# Patient Record
Sex: MALE | Race: WHITE | Employment: FULL TIME | URBAN - NONMETROPOLITAN AREA
[De-identification: names, ages, dates, MRNs, and addresses within clinical notes are randomized per-mention and may not be internally consistent; named-entity substitution may affect disease eponyms.]

---

## 2017-04-09 PROBLEM — F41.9 ANXIETY: Status: ACTIVE | Noted: 2017-04-09

## 2017-04-09 PROBLEM — S40.012A CONTUSION OF SHOULDER, LEFT: Status: ACTIVE | Noted: 2017-04-09

## 2017-04-09 PROBLEM — S82.891D CLOSED FRACTURE OF RIGHT ANKLE WITH ROUTINE HEALING: Status: ACTIVE | Noted: 2017-04-09

## 2017-04-11 PROBLEM — S46.812A STRAIN OF LEFT DELTOID MUSCLE: Status: ACTIVE | Noted: 2017-04-11

## 2017-04-11 PROBLEM — S46.812A: Status: ACTIVE | Noted: 2017-04-11

## 2017-04-11 PROBLEM — S80.01XA CONTUSION OF KNEE, RIGHT: Status: ACTIVE | Noted: 2017-04-11

## 2017-04-11 PROBLEM — S46.819A: Status: ACTIVE | Noted: 2017-04-11

## 2017-04-11 PROBLEM — S86.812A STRAIN OF LEFT PATELLAR TENDON: Status: ACTIVE | Noted: 2017-04-11

## 2017-04-11 PROBLEM — T07.XXXA MULTIPLE ABRASIONS: Status: ACTIVE | Noted: 2017-04-11

## 2017-07-27 ENCOUNTER — OFFICE VISIT (OUTPATIENT)
Dept: PSYCHIATRY | Age: 32
End: 2017-07-27
Payer: COMMERCIAL

## 2017-07-27 VITALS — WEIGHT: 198 LBS | BODY MASS INDEX: 28.01 KG/M2

## 2017-07-27 DIAGNOSIS — F41.0 PANIC DISORDER WITHOUT AGORAPHOBIA WITH MODERATE PANIC ATTACKS: Primary | ICD-10-CM

## 2017-07-27 PROCEDURE — 99215 OFFICE O/P EST HI 40 MIN: CPT | Performed by: NURSE PRACTITIONER

## 2017-07-27 RX ORDER — HYDROXYZINE PAMOATE 25 MG/1
25 CAPSULE ORAL 3 TIMES DAILY PRN
Qty: 90 CAPSULE | Refills: 0 | Status: SHIPPED | OUTPATIENT
Start: 2017-07-27 | End: 2017-08-10

## 2017-12-05 ENCOUNTER — OFFICE VISIT (OUTPATIENT)
Dept: PSYCHIATRY | Age: 32
End: 2017-12-05
Payer: MEDICAID

## 2017-12-05 VITALS — WEIGHT: 226 LBS | BODY MASS INDEX: 31.97 KG/M2

## 2017-12-05 DIAGNOSIS — F31.60 BIPOLAR DISORDER, MIXED (HCC): ICD-10-CM

## 2017-12-05 DIAGNOSIS — F41.9 ANXIETY: Primary | ICD-10-CM

## 2017-12-05 PROCEDURE — G8484 FLU IMMUNIZE NO ADMIN: HCPCS | Performed by: NURSE PRACTITIONER

## 2017-12-05 PROCEDURE — 99214 OFFICE O/P EST MOD 30 MIN: CPT | Performed by: NURSE PRACTITIONER

## 2017-12-05 PROCEDURE — G8417 CALC BMI ABV UP PARAM F/U: HCPCS | Performed by: NURSE PRACTITIONER

## 2017-12-05 PROCEDURE — G8427 DOCREV CUR MEDS BY ELIG CLIN: HCPCS | Performed by: NURSE PRACTITIONER

## 2017-12-05 PROCEDURE — 4004F PT TOBACCO SCREEN RCVD TLK: CPT | Performed by: NURSE PRACTITIONER

## 2017-12-05 NOTE — PROGRESS NOTES
[facility] PROFESSIONAL SERVS  [facility]  [address]  [address] [place] [address]  Dept: [phone]  Dept Fax: [phone]: [phone]      Visit Date: 12/5/2017      Pertinent History & Psychiatric Examination      Florencia Olson is a 29 y.o.  male returns today after 5 months since his last visit from follow-up and medication management. He reports with good improvement in his mood. Patient states that he left his job on the last date that he came to this office--in July. David Ghazal He had had some issues in the past with his employer and decided to quit his job in July. He is reporting that he has never been as happy since he left his job. That means that he is very happy quitting his job. Patient reports that he was not appreciated at his job and therefore did not think he was necessary to remain working for them. He is reporting that he has gotten calls to return back to work but he has chosen not to go back. 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 He lives with his girlfriend who is the breadwinner, while she is going to school. Patient reports that they're living well and are not worried about paying their bills. [place], and their  renter is his girlfriend's parents, who have been good to them. Patient reports that he will return back to his working life as soon as his girlfriend graduates from nursing school. He has not been taking any of his medications and states that he is doing perfectly well. He reports no anxiety and states that he has been doing \"really really well\". Patient is complaining about the current issues with his mom. States that his mom has full custody of his older daughter, who is 11years old. Patient is reporting that his mother manipulated him into signing the Custody papers years ago. He reports that he thought that he was getting a joint custody/guardianship. Within normal limits  Appearance: Street clothes, seated on couch, with good grooming  Behavior/Motor: No abnormalities noted  Attitude toward examiner:  Cooperative, attentive, good eye contact  Speech:  spontaneous, normal rate, normal volume and well articulated  Mood:  Says \" I feel really good\"  Affect:  bright  Thought processes:  linear, goal directed and coherent  Thought content:  Homicidal ideation denies  Suicidal Ideation:  denies suicidal ideation  Delusions:  no evidence of delusions  Perceptual Disturbance:  denies any perceptual disturbance  Cognition:  In tact  Memory: age appropriate  Insight & Judgement: fair  Medication side effects:  See allergies     Clinical Assessment Medical Decision    Anxiety disorder Not Otherwise Specified  Bipolar I disorder; by hx    Past Medical History:   Diagnosis Date    Anxiety     [facility] disorder ([facility] [address]     Depression     Substance abuse        Precautions with Justification:   None    Medication Review/Mgmt: no change     Medical Issues: See above    Assessment of Risk for Harm to Self/Others:  None    PLAN  No medication ordered today as patient is stating that he does not need any medication. Advised patient to see a psychologist Victoriano Orellana, who will be in a better position to write him a letter regarding his capacity to take care of his daughter. Risks/SE's/benefits/alternate treatments discussed, patient stated understanding and is agreeable to treatment plan. Patient's Response to Treatment: positive    I spent a total of 31 minutes with the patient and over half of that time was spent on counseling and coordination of care regarding topics discussed above.     Electronically signed by Antoinette Gates CNP on 12/5/2017 at 10:54 AM

## 2018-02-04 ENCOUNTER — HOSPITAL ENCOUNTER (EMERGENCY)
Age: 33
Discharge: HOME | End: 2018-02-04
Attending: NURSE PRACTITIONER
Payer: MEDICAID

## 2018-02-04 VITALS
HEART RATE: 90 BPM | RESPIRATION RATE: 18 BRPM | OXYGEN SATURATION: 97 % | DIASTOLIC BLOOD PRESSURE: 67 MMHG | BODY MASS INDEX: 33.67 KG/M2 | SYSTOLIC BLOOD PRESSURE: 142 MMHG | TEMPERATURE: 97.8 F | WEIGHT: 238 LBS

## 2018-02-04 DIAGNOSIS — K08.89 DENTALGIA: ICD-10-CM

## 2018-02-04 DIAGNOSIS — H92.03 REFERRED OTALGIA OF BOTH EARS: Primary | ICD-10-CM

## 2018-02-04 PROCEDURE — 99212 OFFICE O/P EST SF 10 MIN: CPT

## 2018-02-04 PROCEDURE — 99213 OFFICE O/P EST LOW 20 MIN: CPT | Performed by: NURSE PRACTITIONER

## 2018-02-04 RX ORDER — IBUPROFEN 400 MG/1
400 TABLET ORAL EVERY 6 HOURS PRN
COMMUNITY
End: 2019-11-24 | Stop reason: SDUPTHER

## 2018-02-04 RX ORDER — CLINDAMYCIN HYDROCHLORIDE 300 MG/1
300 CAPSULE ORAL 3 TIMES DAILY
Qty: 30 CAPSULE | Refills: 0 | Status: SHIPPED | OUTPATIENT
Start: 2018-02-04 | End: 2018-02-14

## 2018-02-04 ASSESSMENT — ENCOUNTER SYMPTOMS
SINUS PRESSURE: 0
SORE THROAT: 0
FACIAL SWELLING: 0
TROUBLE SWALLOWING: 0
COUGH: 1
SINUS PAIN: 0

## 2018-02-04 ASSESSMENT — PAIN DESCRIPTION - PAIN TYPE: TYPE: ACUTE PAIN

## 2018-02-04 ASSESSMENT — PAIN DESCRIPTION - DESCRIPTORS: DESCRIPTORS: SORE

## 2018-02-04 ASSESSMENT — PAIN SCALES - GENERAL: PAINLEVEL_OUTOF10: 2

## 2018-02-04 ASSESSMENT — PAIN DESCRIPTION - FREQUENCY: FREQUENCY: CONTINUOUS

## 2018-02-04 ASSESSMENT — PAIN DESCRIPTION - LOCATION: LOCATION: EAR;JAW

## 2018-02-04 NOTE — ED NOTES
Pt verbalized discharge instructions. Pt informed to go to ER if develop chest pain, shortness of breath or abdominal pain. Pt ambulatory out in stable condition. Assessment unchanged.        Rodrigue Conrad RN  02/04/18 [id number]

## 2018-02-04 NOTE — ED PROVIDER NOTES
Oziel [facility]  Urgent Care Encounter      CHIEF COMPLAINT       Chief Complaint   Patient presents with    Otalgia     bilateral     Jaw Pain     right jaw     Cough       Nurses Notes reviewed and I agree except as noted in the HPI. HISTORY OF PRESENT ILLNESS   Rohini Christianson is a 29 y.o. male who presents With bilateral earache right jaw pain and cough. Onset 1 week ago. He states he is having some dental problems with a couple of teeth on both sides primarily on the right upper. He denies any fever, nasal congestion, sinus pressure any other symptoms. He has been taking ibuprofen off and on for symptoms with partial relief. He is in no acute distress. He states other family members have recently been treated for ear infection so he assumed that that's the problem. REVIEW OF SYSTEMS     Review of Systems   Constitutional: Negative for appetite change, chills and fever. HENT: Positive for dental problem (right upper cracked tooth) and ear pain (bilateral worse on the right). Negative for facial swelling, sinus pain, sinus pressure, sore throat and trouble swallowing. Respiratory: Positive for cough (minimal). PAST MEDICAL HISTORY         Diagnosis Date    Anxiety     B[facility] disorder ([facility] [address]     Depression     Substance abuse        SURGICAL HISTORY     Patient  has a past surgical history that includes knee surgery. CURRENT MEDICATIONS       Discharge Medication List as of 2/4/2018  3:46 PM      CONTINUE these medications which have NOT CHANGED    Details   ibuprofen (ADVIL;MOTRIN) 400 MG tablet Take 400 mg by mouth every 6 hours as needed for PainHistorical Med      acetaminophen (TYLENOL) 325 MG tablet Take 2 tablets by mouth every 4 hours as needed for Pain or Fever, Disp-120 tablet, R-0OTC             ALLERGIES     Patient is is allergic to molds & smuts; mushroom extract complex; and pcn [penicillins].     FAMILY HISTORY     Patient's family history includes Arthritis in his maternal grandmother; Asthma in his maternal grandfather and mother; Diabetes in his maternal grandfather and mother; Heart Disease in his father and maternal grandmother; High Blood Pressure in his father; High Cholesterol in his father; Other in his father, maternal grandfather, and mother. SOCIAL HISTORY     Patient  reports that he has been smoking Cigarettes. He has a 7.50 pack-year smoking history. He has quit using smokeless tobacco. He reports that he drinks about 1.2 oz of alcohol per week . He reports that he does not use drugs. PHYSICAL EXAM     ED TRIAGE VITALS  BP: (!) 142/67, Temp: 97.8 °F (36.6 °C), Pulse: 90, Resp: 18, SpO2: 97 %  Physical Exam   Constitutional: He is oriented to person, place, and time. He appears well-developed and well-nourished. No distress. HENT:   Head: Normocephalic and atraumatic. Right Ear: Tympanic membrane and external ear normal.   Left Ear: Tympanic membrane and external ear normal.   Nose: Nose normal.   Mouth/Throat: Uvula is midline and mucous membranes are normal. Posterior oropharyngeal edema present. No oropharyngeal exudate or posterior oropharyngeal erythema. Eyes: Conjunctivae are normal.   Neck: Neck supple. Cardiovascular: Normal rate and regular rhythm. Exam reveals no gallop and no friction rub. No murmur heard. Pulmonary/Chest: Effort normal and breath sounds normal. No respiratory distress. He has no wheezes. Lymphadenopathy:     He has no cervical adenopathy. Neurological: He is alert and oriented to person, place, and time. Skin: Skin is warm and dry. Psychiatric: He has a normal mood and affect. Nursing note and vitals reviewed. DIAGNOSTIC RESULTS   Labs:No results found for this visit on 02/04/18.     IMAGING:    URGENT CARE COURSE:     Vitals:    02/04/18 1525 02/04/18 1526   BP:  (!) 142/67   Pulse: 90    Resp: 18    Temp: 97.8 °F (36.6 °C)    SpO2: 97%    Weight: 238 lb (108 kg) Medications - No data to display  PROCEDURES:  None  FINAL IMPRESSION      1. Referred otalgia of both ears    2. Dentalgia        DISPOSITION/PLAN   DISPOSITION Decision To Discharge 02/04/2018 03:44:41 PM  I explained to the patient that his ear pain is most likely referred from his dental condition. I strongly recommended that he make an appointment with his dentist as soon as possible. I am going to start him on some clindamycin in the meantime. He should continue his ibuprofen.   PATIENT REFERRED TO:  MD Marcus Garcia [place] [address]  [address]  [phone]    Schedule an appointment as soon as possible for a visit   schedule with your dentist as soon as possible    DISCHARGE MEDICATIONS:  Discharge Medication List as of 2/4/2018  3:46 PM      START taking these medications    Details   clindamycin (CLEOCIN) 300 MG capsule Take 1 capsule by mouth 3 times daily for 10 days, Disp-30 capsule, R-0Print           Discharge Medication List as of 2/4/2018  3:46 PM          Julieta Doe, [address] [place] [address] [address], CNP  02/04/18 3449

## 2018-03-06 ENCOUNTER — OFFICE VISIT (OUTPATIENT)
Dept: PSYCHIATRY | Age: 33
End: 2018-03-06
Payer: MEDICAID

## 2018-03-06 DIAGNOSIS — F41.9 ANXIETY: Primary | ICD-10-CM

## 2018-03-06 PROCEDURE — G8484 FLU IMMUNIZE NO ADMIN: HCPCS | Performed by: NURSE PRACTITIONER

## 2018-03-06 PROCEDURE — G8417 CALC BMI ABV UP PARAM F/U: HCPCS | Performed by: NURSE PRACTITIONER

## 2018-03-06 PROCEDURE — 99214 OFFICE O/P EST MOD 30 MIN: CPT | Performed by: NURSE PRACTITIONER

## 2018-03-06 PROCEDURE — 4004F PT TOBACCO SCREEN RCVD TLK: CPT | Performed by: NURSE PRACTITIONER

## 2018-03-06 PROCEDURE — G8427 DOCREV CUR MEDS BY ELIG CLIN: HCPCS | Performed by: NURSE PRACTITIONER

## 2018-03-13 ENCOUNTER — TELEPHONE (OUTPATIENT)
Dept: PSYCHIATRY | Age: 33
End: 2018-03-13

## 2018-09-06 ENCOUNTER — OFFICE VISIT (OUTPATIENT)
Dept: PSYCHIATRY | Age: 33
End: 2018-09-06
Payer: MEDICAID

## 2018-09-06 VITALS
SYSTOLIC BLOOD PRESSURE: 128 MMHG | DIASTOLIC BLOOD PRESSURE: 82 MMHG | BODY MASS INDEX: 36.35 KG/M2 | WEIGHT: 257 LBS | HEART RATE: 78 BPM

## 2018-09-06 DIAGNOSIS — F41.9 ANXIETY: Primary | ICD-10-CM

## 2018-09-06 PROCEDURE — 99215 OFFICE O/P EST HI 40 MIN: CPT | Performed by: NURSE PRACTITIONER

## 2018-09-06 PROCEDURE — 4004F PT TOBACCO SCREEN RCVD TLK: CPT | Performed by: NURSE PRACTITIONER

## 2018-09-06 PROCEDURE — G8427 DOCREV CUR MEDS BY ELIG CLIN: HCPCS | Performed by: NURSE PRACTITIONER

## 2018-09-06 PROCEDURE — G8417 CALC BMI ABV UP PARAM F/U: HCPCS | Performed by: NURSE PRACTITIONER

## 2018-09-06 NOTE — PROGRESS NOTES
0.75     Years: 10.00     Types: Cigarettes    Smokeless tobacco: Former User    Alcohol use 1.2 oz/week     2 Shots of liquor per week      Comment: rarely     Current Outpatient Prescriptions   Medication Sig Dispense Refill    ibuprofen (ADVIL;MOTRIN) 400 MG tablet Take 400 mg by mouth every 6 hours as needed for Pain      acetaminophen (TYLENOL) 325 MG tablet Take 2 tablets by mouth every 4 hours as needed for Pain or Fever 120 tablet 0     No current facility-administered medications for this visit. Allergies   Allergen Reactions    Molds & Smuts     Mushroom Extract Complex Swelling    Pcn [Penicillins]        Mental Status Evaluation:  Level of consciousness:  Within normal limits  Appearance: Street clothes, seated on couch, with good grooming  Behavior/Motor: No abnormalities noted  Attitude toward examiner:  Cooperative, attentive, good eye contact  Speech:  spontaneous, normal rate, normal volume and well articulated  Mood:  Very good\"  Affect:  mood congruent  Thought processes:  linear, goal directed and coherent  Thought content:  Homicidal ideation denies  Suicidal Ideation:  denies suicidal ideation  Delusions:  no evidence of delusions  Perceptual Disturbance:  denies any perceptual disturbance  Cognition:  In tact  Memory: age appropriate  Insight & Judgement: fair  Medication side effects:  absent       Clinical Assessment Medical Decision    Anxiety disorder Not Otherwise Specified  Bipolar I disorder; by hx      Past Medical History:   Diagnosis Date    Anxiety     B[facility] disorder ([facility] [address]     Depression     Substance abuse        Precautions with Justification:   None    Medication Review/Mgmt: no change     Medical Issues: See above    Assessment of Risk for Harm to Self/Others:  None indicated    PLAN  Still no medications at this time.   Patient appears to be doing well without being on any medications    Risks/SE's/benefits/alternate treatments discussed, patient stated

## 2018-11-01 ENCOUNTER — OFFICE VISIT (OUTPATIENT)
Dept: PSYCHIATRY | Age: 33
End: 2018-11-01
Payer: MEDICAID

## 2018-11-01 VITALS
WEIGHT: 250 LBS | BODY MASS INDEX: 35.36 KG/M2 | SYSTOLIC BLOOD PRESSURE: 119 MMHG | HEART RATE: 62 BPM | DIASTOLIC BLOOD PRESSURE: 64 MMHG

## 2018-11-01 DIAGNOSIS — F41.9 ANXIETY: Primary | ICD-10-CM

## 2018-11-01 PROCEDURE — G8484 FLU IMMUNIZE NO ADMIN: HCPCS | Performed by: NURSE PRACTITIONER

## 2018-11-01 PROCEDURE — G8417 CALC BMI ABV UP PARAM F/U: HCPCS | Performed by: NURSE PRACTITIONER

## 2018-11-01 PROCEDURE — G8427 DOCREV CUR MEDS BY ELIG CLIN: HCPCS | Performed by: NURSE PRACTITIONER

## 2018-11-01 PROCEDURE — 99214 OFFICE O/P EST MOD 30 MIN: CPT | Performed by: NURSE PRACTITIONER

## 2018-11-01 PROCEDURE — 4004F PT TOBACCO SCREEN RCVD TLK: CPT | Performed by: NURSE PRACTITIONER

## 2018-11-01 RX ORDER — BUPROPION HYDROCHLORIDE 150 MG/1
150 TABLET, EXTENDED RELEASE ORAL 2 TIMES DAILY
Qty: 60 TABLET | Refills: 0 | Status: SHIPPED | OUTPATIENT
Start: 2018-11-01 | End: 2018-11-29 | Stop reason: SDUPTHER

## 2018-11-01 NOTE — PROGRESS NOTES
judgement:[id number]}   Suicidal Ideations: denies suicidal ideation   Homicidal Ideations: Negative for homicidal ideation      Medication Side Effects: absent       Attention Span attention span and concentration were age appropriate     Clinical Assessment Medical Decision  Anxiety disorder Not Otherwise Specified    Past Medical History:   Diagnosis Date    Anxiety     B[facility] disorder ([facility] [address]     Substance abuse ([facility]      Precautions with Justification:   None    Medication Review/Mgmt: begin Wellbutrin ER for smoking cessation     Medical Issues: See above    Assessment of Risk for Harm to Self/Others:  None indicated    PLAN  Wellbutrin 150 mg daily for 3 days and then 150 mg BID for 7-12 weeks to help with smoke cessation. Patient verbalized understanding    Risks/SE's/benefits/alternate treatments discussed, patient stated understanding and is agreeable to treatment plan. Patient's Response to Treatment: positive    I spent a total of 27 minutes with the patient and over half of that time was spent on counseling and coordination of care regarding topics discussed above.     Electronically signed by CRISTINA Mendiola CNP on 11/1/2018 at 12:03 PM

## 2018-11-29 RX ORDER — BUPROPION HYDROCHLORIDE 150 MG/1
TABLET, EXTENDED RELEASE ORAL
Qty: 60 TABLET | Refills: 0 | Status: SHIPPED | OUTPATIENT
Start: 2018-11-29 | End: 2019-01-04 | Stop reason: SDUPTHER

## 2018-12-10 ENCOUNTER — HOSPITAL ENCOUNTER (EMERGENCY)
Age: 33
Discharge: HOME | End: 2018-12-10
Payer: MEDICAID

## 2018-12-10 VITALS
OXYGEN SATURATION: 100 % | HEART RATE: 75 BPM | WEIGHT: 250 LBS | TEMPERATURE: 97.7 F | HEIGHT: 70 IN | RESPIRATION RATE: 18 BRPM | SYSTOLIC BLOOD PRESSURE: 147 MMHG | BODY MASS INDEX: 35.79 KG/M2 | DIASTOLIC BLOOD PRESSURE: 80 MMHG

## 2018-12-10 DIAGNOSIS — S61.211A LACERATION OF LEFT INDEX FINGER WITHOUT FOREIGN BODY WITHOUT DAMAGE TO NAIL, INITIAL ENCOUNTER: Primary | ICD-10-CM

## 2018-12-10 PROCEDURE — 99282 EMERGENCY DEPT VISIT SF MDM: CPT

## 2018-12-10 RX ORDER — CEPHALEXIN 500 MG/1
500 CAPSULE ORAL 2 TIMES DAILY
Qty: 14 CAPSULE | Refills: 0 | Status: SHIPPED | OUTPATIENT
Start: 2018-12-10 | End: 2018-12-17

## 2018-12-10 NOTE — ED TRIAGE NOTES
Pt arrives to the ED with a chief complaint of a laceration. Pt states he was eating with a knife when he accidentally cut his index finger of the left hand. Pt has a 1 inch laceration with controlled bleeding. Provider to see patient.

## 2019-01-04 RX ORDER — BUPROPION HYDROCHLORIDE 150 MG/1
TABLET, EXTENDED RELEASE ORAL
Qty: 60 TABLET | Refills: 0 | Status: SHIPPED | OUTPATIENT
Start: 2019-01-04 | End: 2019-08-20

## 2019-03-12 RX ORDER — BUPROPION HYDROCHLORIDE 150 MG/1
TABLET, EXTENDED RELEASE ORAL
Qty: 60 TABLET | Refills: 0 | Status: SHIPPED | OUTPATIENT
Start: 2019-03-12 | End: 2019-08-20

## 2019-04-08 ENCOUNTER — TELEPHONE (OUTPATIENT)
Dept: PSYCHIATRY | Age: 34
End: 2019-04-08

## 2019-04-08 NOTE — TELEPHONE ENCOUNTER
Lucy Oliver () regarding the custody berger of Russ's child called into the office stating that she would like to reach out to his provider to get some additional information; if you could return her call. This office does have a release on file. She asked when Russ's last appt was and if there was any new information on his treatment; in which she has all his office visits. If you would like to reach out to her; her cell # is: [phone]    Patient's last completed appt was on 11/01/18 and he is scheduled to return on 05/02/19.

## 2019-04-11 NOTE — TELEPHONE ENCOUNTER
On molly Chang, I just called-- no answer and the mail box is full. Cannot accept any messages at this time.

## 2019-08-20 ENCOUNTER — OFFICE VISIT (OUTPATIENT)
Dept: PSYCHIATRY | Age: 34
End: 2019-08-20
Payer: MEDICAID

## 2019-08-20 DIAGNOSIS — F31.81 BIPOLAR 2 DISORDER (HCC): ICD-10-CM

## 2019-08-20 DIAGNOSIS — F41.1 GENERALIZED ANXIETY DISORDER: Primary | ICD-10-CM

## 2019-08-20 PROCEDURE — 4004F PT TOBACCO SCREEN RCVD TLK: CPT | Performed by: PSYCHIATRY & NEUROLOGY

## 2019-08-20 PROCEDURE — 90792 PSYCH DIAG EVAL W/MED SRVCS: CPT | Performed by: PSYCHIATRY & NEUROLOGY

## 2019-08-20 RX ORDER — LAMOTRIGINE 25 MG/1
50 TABLET ORAL DAILY
Qty: 21 TABLET | Refills: 1 | Status: SHIPPED | OUTPATIENT
Start: 2019-08-20 | End: 2019-09-18

## 2019-08-20 RX ORDER — LAMOTRIGINE 100 MG/1
100 TABLET ORAL DAILY
Qty: 30 TABLET | Refills: 3 | Status: SHIPPED | OUTPATIENT
Start: 2019-08-20 | End: 2019-09-18

## 2019-08-20 NOTE — PROGRESS NOTES
Speech: Rate, volume, and prosody were normal   Mood: Sad   Affect: Full range, appropriate, and mood congruent   Thought Process: Goal directed and coherent   Thought content: The thought content was appropriate to questions. The patient denies any suicidal ideation or homicidal ideation   Perceptions: Perceptions were normal, the patient denied any auditory, tactile or visual hallucinations   Cognition: Patient is alert and oriented to time, place and person, no gross cognitive deficits   Judgment: Judgment appeared normal and appropriate   Insight: Good insight into illness   Impulse control: Intact     Assessment:   Initial evaluation  Current significant anxiety and easy tearfulness     Diagnosis:   1. Generalized anxiety disorder    2.  Bipolar 2 disorder (HCC)        Plan:  Trial of lamotrigine  Return x 6 weeks

## 2019-09-17 ENCOUNTER — TELEPHONE (OUTPATIENT)
Dept: PSYCHIATRY | Age: 34
End: 2019-09-17

## 2019-09-18 ENCOUNTER — OFFICE VISIT (OUTPATIENT)
Dept: PSYCHIATRY | Age: 34
End: 2019-09-18
Payer: MEDICAID

## 2019-09-18 DIAGNOSIS — F41.1 GENERALIZED ANXIETY DISORDER: ICD-10-CM

## 2019-09-18 DIAGNOSIS — F31.81 BIPOLAR 2 DISORDER (HCC): Primary | ICD-10-CM

## 2019-09-18 PROCEDURE — 4004F PT TOBACCO SCREEN RCVD TLK: CPT | Performed by: PSYCHIATRY & NEUROLOGY

## 2019-09-18 PROCEDURE — G8417 CALC BMI ABV UP PARAM F/U: HCPCS | Performed by: PSYCHIATRY & NEUROLOGY

## 2019-09-18 PROCEDURE — 99214 OFFICE O/P EST MOD 30 MIN: CPT | Performed by: PSYCHIATRY & NEUROLOGY

## 2019-09-18 PROCEDURE — G8428 CUR MEDS NOT DOCUMENT: HCPCS | Performed by: PSYCHIATRY & NEUROLOGY

## 2019-09-18 RX ORDER — QUETIAPINE FUMARATE 25 MG/1
25 TABLET, FILM COATED ORAL DAILY
Qty: 30 TABLET | Refills: 1 | Status: SHIPPED | OUTPATIENT
Start: 2019-09-18 | End: 2019-10-28

## 2019-09-18 NOTE — PROGRESS NOTES
PSYCHIATRIC FOLLOW UP NOTE     PATIENT NAME: Lalito Ellsworth     : 1985   DATE of VISIT: 2019   Chief Complaint   Patient presents with    Anxiety          Interval History: Today I met with . Nellie Downs in follow up. He called for an emergency appointment. He notes that since beginning lamotrigine he has experienced increased irritability. He is getting easily angry with his children, spanked his daughter once, and is yelling at them; he is quite disturbed by this. He has slept 4 hours each of the last 2 nights and is not tired. He reports crying easily and is tearful during his appointment. Current meds:   Current Outpatient Medications   Medication Sig Dispense Refill    QUEtiapine (SEROQUEL) 25 MG tablet Take 1 tablet by mouth daily 30 tablet 1    ibuprofen (ADVIL;MOTRIN) 400 MG tablet Take 400 mg by mouth every 6 hours as needed for Pain      acetaminophen (TYLENOL) 325 MG tablet Take 2 tablets by mouth every 4 hours as needed for Pain or Fever 120 tablet 0     No current facility-administered medications for this visit. Mental Status Exam:   Appearance: Neatly groomed, appropriately dressed   Gait:wnl  Behavior: Cooperative and Appropriate   Eye Contact: Maintained good eye contact   Psychomotor Activity: Normal   Speech: Rate, volume, and prosody were normal   Mood: Tense   Affect: tearful   Thought Process: Goal directed and coherent   Thought content: The thought content was appropriate to questions. The patient denies any suicidal ideation or homicidal ideation.    Perceptions: Perceptions were normal, the patient denied any auditory, tactile or visual hallucinations   Cognition: Patient is alert and oriented to time, place and person, no gross cognitive deficits   Memory:grossly intact  Attention:ok  Judgment: Judgment appeared normal and appropri ate   Insight: Good insight into illness   Impulse control: Fair     Assessment:   Increased irritability and lability on

## 2019-10-28 ENCOUNTER — OFFICE VISIT (OUTPATIENT)
Dept: PSYCHIATRY | Age: 34
End: 2019-10-28
Payer: MEDICAID

## 2019-10-28 DIAGNOSIS — F41.9 ANXIETY: ICD-10-CM

## 2019-10-28 DIAGNOSIS — F31.81 BIPOLAR 2 DISORDER (HCC): Primary | ICD-10-CM

## 2019-10-28 PROCEDURE — G8428 CUR MEDS NOT DOCUMENT: HCPCS | Performed by: PSYCHIATRY & NEUROLOGY

## 2019-10-28 PROCEDURE — 99213 OFFICE O/P EST LOW 20 MIN: CPT | Performed by: PSYCHIATRY & NEUROLOGY

## 2019-10-28 PROCEDURE — G8417 CALC BMI ABV UP PARAM F/U: HCPCS | Performed by: PSYCHIATRY & NEUROLOGY

## 2019-10-28 PROCEDURE — 4004F PT TOBACCO SCREEN RCVD TLK: CPT | Performed by: PSYCHIATRY & NEUROLOGY

## 2019-10-28 PROCEDURE — G8484 FLU IMMUNIZE NO ADMIN: HCPCS | Performed by: PSYCHIATRY & NEUROLOGY

## 2019-11-13 ENCOUNTER — HOSPITAL ENCOUNTER (EMERGENCY)
Age: 34
Discharge: HOME | End: 2019-11-13
Attending: PSYCHIATRY & NEUROLOGY | Admitting: PSYCHIATRY & NEUROLOGY
Payer: MEDICAID

## 2019-11-13 ENCOUNTER — TELEPHONE (OUTPATIENT)
Dept: PSYCHIATRY | Age: 34
End: 2019-11-13

## 2019-11-13 VITALS
SYSTOLIC BLOOD PRESSURE: 133 MMHG | OXYGEN SATURATION: 98 % | DIASTOLIC BLOOD PRESSURE: 88 MMHG | HEART RATE: 60 BPM | RESPIRATION RATE: 16 BRPM | TEMPERATURE: 98 F

## 2019-11-13 DIAGNOSIS — F41.1 ANXIETY STATE: ICD-10-CM

## 2019-11-13 DIAGNOSIS — F32.A DEPRESSION, UNSPECIFIED DEPRESSION TYPE: Primary | ICD-10-CM

## 2019-11-13 PROBLEM — F32.9 MAJOR DEPRESSIVE DISORDER, SINGLE EPISODE: Status: ACTIVE | Noted: 2019-11-13

## 2019-11-13 LAB
ACETAMINOPHEN LEVEL: < 5 UG/ML (ref 0–20)
ALBUMIN SERPL-MCNC: 4.7 G/DL (ref 3.5–5.1)
ALP BLD-CCNC: 83 U/L (ref 38–126)
ALT SERPL-CCNC: 15 U/L (ref 11–66)
AMPHETAMINE+METHAMPHETAMINE URINE SCREEN: NEGATIVE
ANION GAP SERPL CALCULATED.3IONS-SCNC: 14 MEQ/L (ref 8–16)
AST SERPL-CCNC: 15 U/L (ref 5–40)
BARBITURATE QUANTITATIVE URINE: NEGATIVE
BASOPHILS # BLD: 0.3 %
BASOPHILS ABSOLUTE: 0 THOU/MM3 (ref 0–0.1)
BENZODIAZEPINE QUANTITATIVE URINE: NEGATIVE
BILIRUB SERPL-MCNC: 0.4 MG/DL (ref 0.3–1.2)
BILIRUBIN DIRECT: < 0.2 MG/DL (ref 0–0.3)
BILIRUBIN URINE: NEGATIVE
BLOOD, URINE: NEGATIVE
BUN BLDV-MCNC: 11 MG/DL (ref 7–22)
CALCIUM SERPL-MCNC: 9.9 MG/DL (ref 8.5–10.5)
CANNABINOID QUANTITATIVE URINE: POSITIVE
CHARACTER, URINE: CLEAR
CHLORIDE BLD-SCNC: 103 MEQ/L (ref 98–111)
CO2: 26 MEQ/L (ref 23–33)
COCAINE METABOLITE QUANTITATIVE URINE: NEGATIVE
COLOR: YELLOW
CREAT SERPL-MCNC: 0.8 MG/DL (ref 0.4–1.2)
EOSINOPHIL # BLD: 1.7 %
EOSINOPHILS ABSOLUTE: 0.1 THOU/MM3 (ref 0–0.4)
ERYTHROCYTE [DISTWIDTH] IN BLOOD BY AUTOMATED COUNT: 13.1 % (ref 11.5–14.5)
ERYTHROCYTE [DISTWIDTH] IN BLOOD BY AUTOMATED COUNT: 43.3 FL (ref 35–45)
ETHYL ALCOHOL, SERUM: < 0.01 %
GFR SERPL CREATININE-BSD FRML MDRD: > 90 ML/MIN/1.73M2
GLUCOSE BLD-MCNC: 92 MG/DL (ref 70–108)
GLUCOSE URINE: NEGATIVE MG/DL
HCT VFR BLD CALC: 49.6 % (ref 42–52)
HEMOGLOBIN: 16.9 GM/DL (ref 14–18)
IMMATURE GRANS (ABS): 0.01 THOU/MM3 (ref 0–0.07)
IMMATURE GRANULOCYTES: 0.1 %
KETONES, URINE: NEGATIVE
LEUKOCYTE ESTERASE, URINE: NEGATIVE
LYMPHOCYTES # BLD: 32.1 %
LYMPHOCYTES ABSOLUTE: 2.3 THOU/MM3 (ref 1–4.8)
MCH RBC QN AUTO: 30.6 PG (ref 26–33)
MCHC RBC AUTO-ENTMCNC: 34.1 GM/DL (ref 32.2–35.5)
MCV RBC AUTO: 89.7 FL (ref 80–94)
MONOCYTES # BLD: 9 %
MONOCYTES ABSOLUTE: 0.6 THOU/MM3 (ref 0.4–1.3)
NITRITE, URINE: NEGATIVE
NUCLEATED RED BLOOD CELLS: 0 /100 WBC
OPIATES, URINE: NEGATIVE
OSMOLALITY CALCULATION: 284 MOSMOL/KG (ref 275–300)
OXYCODONE: NEGATIVE
PH UA: 5.5 (ref 5–9)
PHENCYCLIDINE QUANTITATIVE URINE: NEGATIVE
PLATELET # BLD: 297 THOU/MM3 (ref 130–400)
PMV BLD AUTO: 10.9 FL (ref 9.4–12.4)
POTASSIUM SERPL-SCNC: 3.9 MEQ/L (ref 3.5–5.2)
PROTEIN UA: NEGATIVE
RBC # BLD: 5.53 MILL/MM3 (ref 4.7–6.1)
SALICYLATE, SERUM: < 0.3 MG/DL (ref 2–10)
SEG NEUTROPHILS: 56.8 %
SEGMENTED NEUTROPHILS ABSOLUTE COUNT: 4.1 THOU/MM3 (ref 1.8–7.7)
SODIUM BLD-SCNC: 143 MEQ/L (ref 135–145)
SPECIFIC GRAVITY, URINE: 1.01 (ref 1–1.03)
TOTAL PROTEIN: 7.8 G/DL (ref 6.1–8)
TSH SERPL DL<=0.05 MIU/L-ACNC: 4.36 UIU/ML (ref 0.4–4.2)
UROBILINOGEN, URINE: 0.2 EU/DL (ref 0–1)
WBC # BLD: 7.2 THOU/MM3 (ref 4.8–10.8)

## 2019-11-13 PROCEDURE — 6370000000 HC RX 637 (ALT 250 FOR IP): Performed by: PHYSICIAN ASSISTANT

## 2019-11-13 PROCEDURE — 85025 COMPLETE CBC W/AUTO DIFF WBC: CPT

## 2019-11-13 PROCEDURE — 99284 EMERGENCY DEPT VISIT MOD MDM: CPT

## 2019-11-13 PROCEDURE — 80053 COMPREHEN METABOLIC PANEL: CPT

## 2019-11-13 PROCEDURE — 84443 ASSAY THYROID STIM HORMONE: CPT

## 2019-11-13 PROCEDURE — G0480 DRUG TEST DEF 1-7 CLASSES: HCPCS

## 2019-11-13 PROCEDURE — 81003 URINALYSIS AUTO W/O SCOPE: CPT

## 2019-11-13 PROCEDURE — 82248 BILIRUBIN DIRECT: CPT

## 2019-11-13 PROCEDURE — 36415 COLL VENOUS BLD VENIPUNCTURE: CPT

## 2019-11-13 PROCEDURE — 80307 DRUG TEST PRSMV CHEM ANLYZR: CPT

## 2019-11-13 RX ORDER — IBUPROFEN 200 MG
400 TABLET ORAL EVERY 6 HOURS PRN
Status: CANCELLED | OUTPATIENT
Start: 2019-11-13

## 2019-11-13 RX ORDER — ACETAMINOPHEN 325 MG/1
650 TABLET ORAL EVERY 4 HOURS PRN
Status: CANCELLED | OUTPATIENT
Start: 2019-11-13

## 2019-11-13 RX ORDER — NICOTINE 21 MG/24HR
1 PATCH, TRANSDERMAL 24 HOURS TRANSDERMAL DAILY
Status: CANCELLED | OUTPATIENT
Start: 2019-11-13

## 2019-11-13 RX ORDER — ACETAMINOPHEN 325 MG/1
650 TABLET ORAL ONCE
Status: COMPLETED | OUTPATIENT
Start: 2019-11-13 | End: 2019-11-13

## 2019-11-13 RX ORDER — LIDOCAINE 4 G/G
1 PATCH TOPICAL DAILY
Status: DISCONTINUED | OUTPATIENT
Start: 2019-11-13 | End: 2019-11-13 | Stop reason: HOSPADM

## 2019-11-13 RX ORDER — TRAZODONE HYDROCHLORIDE 50 MG/1
50 TABLET ORAL NIGHTLY PRN
Status: CANCELLED | OUTPATIENT
Start: 2019-11-13

## 2019-11-13 RX ORDER — HYDROXYZINE HYDROCHLORIDE 25 MG/1
50 TABLET, FILM COATED ORAL 3 TIMES DAILY PRN
Status: CANCELLED | OUTPATIENT
Start: 2019-11-13

## 2019-11-13 RX ORDER — MAGNESIUM HYDROXIDE/ALUMINUM HYDROXICE/SIMETHICONE 120; 1200; 1200 MG/30ML; MG/30ML; MG/30ML
30 SUSPENSION ORAL EVERY 6 HOURS PRN
Status: CANCELLED | OUTPATIENT
Start: 2019-11-13

## 2019-11-13 RX ADMIN — ACETAMINOPHEN 650 MG: 325 TABLET ORAL at 19:25

## 2019-11-13 ASSESSMENT — ENCOUNTER SYMPTOMS
CONSTIPATION: 0
SHORTNESS OF BREATH: 0
ABDOMINAL PAIN: 0
VOMITING: 0
BACK PAIN: 0
DIARRHEA: 0
NAUSEA: 0

## 2019-11-13 ASSESSMENT — SLEEP AND FATIGUE QUESTIONNAIRES
DIFFICULTY ARISING: NO
DIFFICULTY FALLING ASLEEP: YES
SLEEP PATTERN: NIGHTMARES/TERRORS
DIFFICULTY STAYING ASLEEP: YES
SLEEP PATTERN: DIFFICULTY FALLING ASLEEP;DISTURBED/INTERRUPTED SLEEP
RESTFUL SLEEP: NO
DO YOU HAVE DIFFICULTY SLEEPING: YES
AVERAGE NUMBER OF SLEEP HOURS: 0

## 2019-11-13 ASSESSMENT — PAIN SCALES - GENERAL: PAINLEVEL_OUTOF10: 5

## 2019-11-13 ASSESSMENT — PATIENT HEALTH QUESTIONNAIRE - PHQ9: SUM OF ALL RESPONSES TO PHQ QUESTIONS 1-9: 20

## 2019-11-20 ENCOUNTER — OFFICE VISIT (OUTPATIENT)
Dept: PSYCHIATRY | Age: 34
End: 2019-11-20
Payer: MEDICAID

## 2019-11-20 DIAGNOSIS — F31.81 BIPOLAR 2 DISORDER (HCC): Primary | ICD-10-CM

## 2019-11-20 DIAGNOSIS — F41.9 ANXIETY: ICD-10-CM

## 2019-11-20 PROCEDURE — 4004F PT TOBACCO SCREEN RCVD TLK: CPT | Performed by: PSYCHIATRY & NEUROLOGY

## 2019-11-20 PROCEDURE — 99214 OFFICE O/P EST MOD 30 MIN: CPT | Performed by: PSYCHIATRY & NEUROLOGY

## 2019-11-20 PROCEDURE — G8417 CALC BMI ABV UP PARAM F/U: HCPCS | Performed by: PSYCHIATRY & NEUROLOGY

## 2019-11-20 PROCEDURE — G8428 CUR MEDS NOT DOCUMENT: HCPCS | Performed by: PSYCHIATRY & NEUROLOGY

## 2019-11-20 PROCEDURE — G8484 FLU IMMUNIZE NO ADMIN: HCPCS | Performed by: PSYCHIATRY & NEUROLOGY

## 2019-11-20 RX ORDER — PRAZOSIN HYDROCHLORIDE 1 MG/1
3 CAPSULE ORAL NIGHTLY
Qty: 30 CAPSULE | Refills: 1 | Status: SHIPPED | OUTPATIENT
Start: 2019-11-20 | End: 2019-12-16 | Stop reason: SDUPTHER

## 2019-11-20 RX ORDER — BUSPIRONE HYDROCHLORIDE 7.5 MG/1
7.5 TABLET ORAL 2 TIMES DAILY
Qty: 60 TABLET | Refills: 0 | Status: SHIPPED | OUTPATIENT
Start: 2019-11-20 | End: 2019-12-16 | Stop reason: SDUPTHER

## 2019-11-24 ENCOUNTER — APPOINTMENT (OUTPATIENT)
Dept: GENERAL RADIOLOGY | Age: 34
End: 2019-11-24
Payer: MEDICAID

## 2019-11-24 ENCOUNTER — HOSPITAL ENCOUNTER (EMERGENCY)
Age: 34
Discharge: HOME | End: 2019-11-24
Payer: MEDICAID

## 2019-11-24 VITALS
TEMPERATURE: 97.7 F | RESPIRATION RATE: 18 BRPM | OXYGEN SATURATION: 100 % | WEIGHT: 200 LBS | DIASTOLIC BLOOD PRESSURE: 91 MMHG | HEIGHT: 70 IN | SYSTOLIC BLOOD PRESSURE: 138 MMHG | BODY MASS INDEX: 28.63 KG/M2 | HEART RATE: 71 BPM

## 2019-11-24 DIAGNOSIS — S50.11XA CONTUSION OF RIGHT FOREARM, INITIAL ENCOUNTER: Primary | ICD-10-CM

## 2019-11-24 PROCEDURE — 99283 EMERGENCY DEPT VISIT LOW MDM: CPT

## 2019-11-24 PROCEDURE — 73090 X-RAY EXAM OF FOREARM: CPT

## 2019-11-24 RX ORDER — IBUPROFEN 800 MG/1
800 TABLET ORAL EVERY 6 HOURS PRN
Qty: 15 TABLET | Refills: 0 | Status: SHIPPED | OUTPATIENT
Start: 2019-11-24 | End: 2020-03-14

## 2019-11-24 ASSESSMENT — PAIN DESCRIPTION - ORIENTATION: ORIENTATION: RIGHT

## 2019-11-24 ASSESSMENT — PAIN DESCRIPTION - LOCATION: LOCATION: ARM

## 2019-11-24 ASSESSMENT — ENCOUNTER SYMPTOMS
NAUSEA: 0
SHORTNESS OF BREATH: 0
BACK PAIN: 0
RHINORRHEA: 0
PHOTOPHOBIA: 0
VOMITING: 0

## 2019-11-24 ASSESSMENT — PAIN SCALES - GENERAL: PAINLEVEL_OUTOF10: 3

## 2019-11-24 ASSESSMENT — PAIN DESCRIPTION - DESCRIPTORS: DESCRIPTORS: ACHING

## 2019-11-24 ASSESSMENT — PAIN DESCRIPTION - PAIN TYPE: TYPE: ACUTE PAIN

## 2019-11-24 ASSESSMENT — PAIN DESCRIPTION - FREQUENCY: FREQUENCY: CONTINUOUS

## 2019-12-16 ENCOUNTER — OFFICE VISIT (OUTPATIENT)
Dept: PSYCHIATRY | Age: 34
End: 2019-12-16
Payer: MEDICAID

## 2019-12-16 DIAGNOSIS — F31.81 BIPOLAR 2 DISORDER (HCC): Primary | ICD-10-CM

## 2019-12-16 DIAGNOSIS — F41.9 ANXIETY: ICD-10-CM

## 2019-12-16 PROCEDURE — G8417 CALC BMI ABV UP PARAM F/U: HCPCS | Performed by: PSYCHIATRY & NEUROLOGY

## 2019-12-16 PROCEDURE — 99214 OFFICE O/P EST MOD 30 MIN: CPT | Performed by: PSYCHIATRY & NEUROLOGY

## 2019-12-16 PROCEDURE — G8484 FLU IMMUNIZE NO ADMIN: HCPCS | Performed by: PSYCHIATRY & NEUROLOGY

## 2019-12-16 PROCEDURE — G8428 CUR MEDS NOT DOCUMENT: HCPCS | Performed by: PSYCHIATRY & NEUROLOGY

## 2019-12-16 PROCEDURE — 4004F PT TOBACCO SCREEN RCVD TLK: CPT | Performed by: PSYCHIATRY & NEUROLOGY

## 2019-12-16 RX ORDER — PRAZOSIN HYDROCHLORIDE 5 MG/1
5 CAPSULE ORAL NIGHTLY
Qty: 30 CAPSULE | Refills: 2 | Status: SHIPPED | OUTPATIENT
Start: 2019-12-16 | End: 2020-02-17 | Stop reason: SDUPTHER

## 2019-12-16 RX ORDER — BUSPIRONE HYDROCHLORIDE 7.5 MG/1
7.5 TABLET ORAL 2 TIMES DAILY
Qty: 60 TABLET | Refills: 2 | Status: SHIPPED | OUTPATIENT
Start: 2019-12-16 | End: 2020-02-17 | Stop reason: SDUPTHER

## 2020-02-03 ENCOUNTER — HOSPITAL ENCOUNTER (EMERGENCY)
Age: 35
Discharge: HOME | End: 2020-02-03
Payer: MEDICAID

## 2020-02-03 VITALS
HEART RATE: 68 BPM | SYSTOLIC BLOOD PRESSURE: 132 MMHG | BODY MASS INDEX: 28.63 KG/M2 | RESPIRATION RATE: 18 BRPM | DIASTOLIC BLOOD PRESSURE: 71 MMHG | TEMPERATURE: 97.3 F | OXYGEN SATURATION: 97 % | HEIGHT: 70 IN | WEIGHT: 200 LBS

## 2020-02-03 PROCEDURE — 99213 OFFICE O/P EST LOW 20 MIN: CPT | Performed by: NURSE PRACTITIONER

## 2020-02-03 PROCEDURE — 99212 OFFICE O/P EST SF 10 MIN: CPT

## 2020-02-03 ASSESSMENT — PAIN DESCRIPTION - PAIN TYPE: TYPE: ACUTE PAIN

## 2020-02-03 ASSESSMENT — PAIN DESCRIPTION - PROGRESSION: CLINICAL_PROGRESSION: GRADUALLY WORSENING

## 2020-02-03 ASSESSMENT — PAIN DESCRIPTION - LOCATION: LOCATION: HEAD

## 2020-02-03 ASSESSMENT — PAIN DESCRIPTION - FREQUENCY: FREQUENCY: INTERMITTENT

## 2020-02-03 ASSESSMENT — PAIN DESCRIPTION - DESCRIPTORS: DESCRIPTORS: ACHING

## 2020-02-03 ASSESSMENT — PAIN DESCRIPTION - ONSET: ONSET: ON-GOING

## 2020-02-03 ASSESSMENT — PAIN SCALES - GENERAL: PAINLEVEL_OUTOF10: 3

## 2020-02-03 NOTE — ED TRIAGE NOTES
Pt to urgent care due to a cough, fever, head and chest congestion. New onset of symptoms started yesterday.

## 2020-02-03 NOTE — ED PROVIDER NOTES
No tenderness. Musculoskeletal: Normal range of motion. Skin:     General: Skin is warm. Neurological:      General: No focal deficit present. Mental Status: He is alert and oriented to person, place, and time. Sensory: No sensory deficit. Psychiatric:         Mood and Affect: Mood normal.         Behavior: Behavior normal.         Thought Content: Thought content normal.         Judgment: Judgment normal.         DIAGNOSTIC RESULTS     Labs:No results found for this visit on 02/03/20. IMAGING:    No orders to display     URGENT CARE COURSE:     Vitals:    02/03/20 1316 02/03/20 1330   BP:  132/71   Pulse:  68   Resp: 18 18   Temp:  97.3 °F (36.3 °C)   TempSrc:  Temporal   SpO2:  97%   Weight: 200 lb (90.7 kg)    Height: 5' 10\" (1.778 m)        Medications - No data to display         PROCEDURES:  None    FINAL IMPRESSION      1. Acute upper respiratory infection    2. Flu-like symptoms    3. Upper respiratory tract infection, unspecified type          DISPOSITION/ PLAN   Patient is discharged home with instructions to utilize symptomatic treatment as there is strong suspicion for viral involvement given there is abrupt onset. Patient will be most contagious with any fevers, vomiting, or diarrhea Should follow up with PCP with 1 week if symptoms are not improving.          PATIENT REFERRED TO:  Kassie Mcclain MD  [address] [facility] [place] [address] / [facility] [id number]      DISCHARGE MEDICATIONS:  Discharge Medication List as of 2/3/2020  2:03 PM          Discharge Medication List as of 2/3/2020  2:03 PM          Discharge Medication List as of 2/3/2020  2:03 PM          Pearlene Aschoff, APRN - NP    (Please note that portions of this note were completed with a voice recognition program. Efforts were made to edit the dictations but occasionally words are mis-transcribed.)         CRISTINA Sagastume NP  02/04/20 2732

## 2020-02-04 ASSESSMENT — ENCOUNTER SYMPTOMS
WHEEZING: 0
RHINORRHEA: 0
ABDOMINAL PAIN: 0
SINUS PRESSURE: 1
NAUSEA: 0
VOMITING: 0
COUGH: 0
SINUS PAIN: 1
SORE THROAT: 0
STRIDOR: 0
DIARRHEA: 0
SHORTNESS OF BREATH: 0

## 2020-02-08 ENCOUNTER — HOSPITAL ENCOUNTER (EMERGENCY)
Age: 35
Discharge: HOME | End: 2020-02-08
Payer: MEDICAID

## 2020-02-08 VITALS
TEMPERATURE: 97.3 F | OXYGEN SATURATION: 97 % | DIASTOLIC BLOOD PRESSURE: 77 MMHG | BODY MASS INDEX: 29.41 KG/M2 | WEIGHT: 205 LBS | RESPIRATION RATE: 20 BRPM | SYSTOLIC BLOOD PRESSURE: 111 MMHG | HEART RATE: 79 BPM

## 2020-02-08 PROCEDURE — 99212 OFFICE O/P EST SF 10 MIN: CPT

## 2020-02-08 RX ORDER — AMOXICILLIN 500 MG/1
500 TABLET, FILM COATED ORAL 2 TIMES DAILY
Qty: 20 TABLET | Refills: 0 | Status: SHIPPED | OUTPATIENT
Start: 2020-02-08 | End: 2020-02-18

## 2020-02-08 ASSESSMENT — PAIN DESCRIPTION - PAIN TYPE: TYPE: ACUTE PAIN

## 2020-02-08 ASSESSMENT — PAIN SCALES - GENERAL: PAINLEVEL_OUTOF10: 3

## 2020-02-09 ASSESSMENT — ENCOUNTER SYMPTOMS
NAUSEA: 0
DIARRHEA: 0
SORE THROAT: 0
STRIDOR: 0
SINUS PAIN: 1
CHEST TIGHTNESS: 0
EYE ITCHING: 0
WHEEZING: 0
EYE REDNESS: 0
SINUS PRESSURE: 1
EYE DISCHARGE: 0
VOMITING: 0
SHORTNESS OF BREATH: 0
COUGH: 1
ABDOMINAL PAIN: 0

## 2020-02-09 NOTE — ED PROVIDER NOTES
[place] [id number]  Urgent Care Encounter       CHIEF COMPLAINT       Chief Complaint   Patient presents with    Cough     Productive cough, fever, nasal congestion x's 1 week. Nurses Notes reviewed and I agree except as noted in the HPI. HISTORY OF PRESENT ILLNESS   Juju Sanders is a 30 y.o. male who presents     Patient states that within the last week he has noticed ongoing nasal congestion, fevers, and productive cough. Patient states that he was seen about a week ago, and was told that there was high suspicion for viral involvement. Patient's child who also has similar symptoms was tested for influenza and told that it was negative, however they were to continue symptomatic treatment. Patient was unable to be seen by his primary care provider as they are unable to get an appointment for the next 2 to 3 weeks. The history is provided by the patient.    URI   Presenting symptoms: congestion, cough, fatigue and fever    Presenting symptoms: no sore throat    Congestion:     Location:  Nasal    Interferes with sleep: no      Interferes with eating/drinking: no    Cough:     Cough characteristics:  Non-productive    Sputum characteristics:  Nondescript    Severity:  Mild    Duration:  1 week    Timing:  Intermittent    Progression:  Waxing and waning    Chronicity:  New  Fatigue:     Severity:  Mild    Duration:  2 days    Timing:  Intermittent    Progression:  Unchanged  Onset quality:  Gradual  Duration:  4 days  Timing:  Sporadic  Progression:  Partially resolved  Chronicity:  New  Relieved by:  Nothing  Ineffective treatments:  OTC medications and rest  Associated symptoms: myalgias and sinus pain    Associated symptoms: no headaches, no neck pain and no wheezing    Myalgias:     Location:  Generalized    Quality:  Aching    Severity:  Mild    Onset quality:  Gradual    Duration:  4 days    Timing:  Intermittent    Progression:  Unchanged  Risk factors: sick contacts (Several household members)    Risk factors: not elderly, no chronic cardiac disease, no chronic kidney disease, no chronic respiratory disease, no diabetes mellitus, no immunosuppression, no recent illness and no recent travel        REVIEW OF SYSTEMS     Review of Systems   Constitutional: Positive for fatigue and fever. Negative for chills. HENT: Positive for congestion, postnasal drip, sinus pressure and sinus pain. Negative for sore throat. Eyes: Negative for discharge, redness and itching. Respiratory: Positive for cough. Negative for chest tightness, shortness of breath, wheezing and stridor. Gastrointestinal: Negative for abdominal pain, diarrhea, nausea and vomiting. Musculoskeletal: Positive for myalgias. Negative for neck pain. Skin: Negative for rash. Neurological: Negative for dizziness, weakness, light-headedness, numbness and headaches. PAST MEDICAL HISTORY         Diagnosis Date    Anxiety     B[facility] disorder ([facility] [facility]     [facility]        SURGICALHISTORY     Patient  has a past surgical history that includes knee surgery. CURRENT MEDICATIONS       Discharge Medication List as of 2/8/2020  6:55 PM      CONTINUE these medications which have NOT CHANGED    Details   busPIRone (BUSPAR) 7.5 MG tablet Take 1 tablet by mouth 2 times daily, Disp-60 tablet, R-2Normal      prazosin (MINIPRESS) 5 MG capsule Take 1 capsule by mouth nightly, Disp-30 capsule, R-2Normal      ibuprofen (ADVIL;MOTRIN) 800 MG tablet Take 1 tablet by mouth every 6 hours as needed for Pain, Disp-15 tablet, R-0Print      acetaminophen (TYLENOL) 325 MG tablet Take 2 tablets by mouth every 4 hours as needed for Pain or Fever, Disp-120 tablet, R-0OTC             ALLERGIES     Patient is is allergic to molds & smuts; mushroom extract complex; and pcn [penicillins]. Patients There is no immunization history for the selected administration types on file for this patient.     FAMILY HISTORY     Patient's family history includes Arthritis in his maternal grandmother; Asthma in his maternal grandfather and mother; Diabetes in his maternal grandfather and mother; Heart Disease in his father and maternal grandmother; High Blood Pressure in his father; High Cholesterol in his father; Other in his father, maternal grandfather, and mother. SOCIAL HISTORY     Patient  reports that he has been smoking cigarettes. He has a 7.50 pack-year smoking history. He has quit using smokeless tobacco. He reports current alcohol use of about 2.0 standard drinks of alcohol per week. He reports that he does not use drugs. PHYSICAL EXAM     ED TRIAGE VITALS  BP: 111/77, Temp: 97.3 °F (36.3 °C), Pulse: 79, Resp: 20, SpO2: 97 %,Estimated body mass index is 29.41 kg/m² as calculated from the following:    Height as of 2/3/20: 5' 10\" (1.778 m). Weight as of this encounter: 205 lb (93 kg). ,No LMP for male patient. Physical Exam  Constitutional:       General: He is not in acute distress. Appearance: Normal appearance. He is not ill-appearing, toxic-appearing or diaphoretic. HENT:      Nose: Congestion present. Mouth/Throat:      Mouth: Mucous membranes are moist.      Pharynx: No oropharyngeal exudate or posterior oropharyngeal erythema. Cardiovascular:      Rate and Rhythm: Normal rate. Pulmonary:      Effort: Pulmonary effort is normal. No respiratory distress. Breath sounds: Normal breath sounds. No stridor. No wheezing, rhonchi or rales. Chest:      Chest wall: No tenderness. Musculoskeletal: Normal range of motion. Skin:     Findings: No erythema or rash. Neurological:      General: No focal deficit present. Mental Status: He is alert and oriented to person, place, and time. Sensory: No sensory deficit. Psychiatric:         Mood and Affect: Mood normal.         Behavior: Behavior normal.         Thought Content:  Thought content normal.         Judgment: Judgment normal.         DIAGNOSTIC RESULTS     Labs:No results found for this visit on 02/08/20. IMAGING:    No orders to display     URGENT CARE COURSE:     Vitals:    02/08/20 1833   BP: 111/77   Pulse: 79   Resp: 20   Temp: 97.3 °F (36.3 °C)   TempSrc: Temporal   SpO2: 97%   Weight: 205 lb (93 kg)       Medications - No data to display         PROCEDURES:  None    FINAL IMPRESSION      1. Upper respiratory tract infection, unspecified type    2. Cough          DISPOSITION/ PLAN   Patient is discharged home with script for amoxicillin given that patient's URI symptoms have not improved within the last week. Most URI are viral and should improve within a week not worsen or linger. Discussed with patient that he should continue OTC meds such as tylenol and motrin and adequate fluid hydration. Patient should follow up with PCP within 1 week if symptoms have not improved.         PATIENT REFERRED TO:  Stevie Monae MD  [address] [facility]  [address] [address]      DISCHARGE MEDICATIONS:  Discharge Medication List as of 2/8/2020  6:55 PM      START taking these medications    Details   Amoxicillin 500 MG TABS Take 500 mg by mouth 2 times daily for 10 days, Disp-20 tablet, R-0Print             Discharge Medication List as of 2/8/2020  6:55 PM          Discharge Medication List as of 2/8/2020  6:55 PM          CRISTINA Taylor NP    (Please note that portions of this note were completed with a voice recognition program. Efforts were made to edit the dictations but occasionally words are mis-transcribed.)         CRISTINA Hernández NP  02/09/20 6818

## 2020-02-17 ENCOUNTER — OFFICE VISIT (OUTPATIENT)
Dept: PSYCHIATRY | Age: 35
End: 2020-02-17
Payer: MEDICAID

## 2020-02-17 PROCEDURE — G8484 FLU IMMUNIZE NO ADMIN: HCPCS | Performed by: PSYCHIATRY & NEUROLOGY

## 2020-02-17 PROCEDURE — 99214 OFFICE O/P EST MOD 30 MIN: CPT | Performed by: PSYCHIATRY & NEUROLOGY

## 2020-02-17 PROCEDURE — 4004F PT TOBACCO SCREEN RCVD TLK: CPT | Performed by: PSYCHIATRY & NEUROLOGY

## 2020-02-17 PROCEDURE — G8417 CALC BMI ABV UP PARAM F/U: HCPCS | Performed by: PSYCHIATRY & NEUROLOGY

## 2020-02-17 PROCEDURE — G8428 CUR MEDS NOT DOCUMENT: HCPCS | Performed by: PSYCHIATRY & NEUROLOGY

## 2020-02-17 RX ORDER — BUSPIRONE HYDROCHLORIDE 7.5 MG/1
7.5 TABLET ORAL 2 TIMES DAILY
Qty: 60 TABLET | Refills: 2 | Status: SHIPPED | OUTPATIENT
Start: 2020-02-17 | End: 2020-05-17

## 2020-02-17 RX ORDER — PRAZOSIN HYDROCHLORIDE 5 MG/1
5 CAPSULE ORAL NIGHTLY
Qty: 30 CAPSULE | Refills: 3 | Status: SHIPPED | OUTPATIENT
Start: 2020-02-17

## 2020-02-17 RX ORDER — PRAZOSIN HYDROCHLORIDE 2 MG/1
2 CAPSULE ORAL NIGHTLY
Qty: 30 CAPSULE | Refills: 3 | Status: SHIPPED | OUTPATIENT
Start: 2020-02-17 | End: 2021-04-14

## 2020-02-17 NOTE — PROGRESS NOTES
PSYCHIATRIC FOLLOW UP NOTE     PATIENT NAME: Mala Ledesma     : 1985   DATE of VISIT: 2020   Chief Complaint   Patient presents with    Anxiety          Interval History: Today I met with . Roxanna Bergeron in follow up. He continues to do well. He likes his job as a  at [employer] he and his wife are reconciling - he remarks that their relationship is complicated. He feels generally less anxious and startles less easily. He continues to have \"crazy dreams\" and although they are less disturbing than previously he would like to try a higher dose of prazosin     Current meds:   Current Outpatient Medications   Medication Sig Dispense Refill    prazosin (MINIPRESS) 5 MG capsule Take 1 capsule by mouth nightly 30 capsule 3    prazosin (MINIPRESS) 2 MG capsule Take 1 capsule by mouth nightly 30 capsule 3    busPIRone (BUSPAR) 7.5 MG tablet Take 1 tablet by mouth 2 times daily 60 tablet 2    Amoxicillin 500 MG TABS Take 500 mg by mouth 2 times daily for 10 days 20 tablet 0    ibuprofen (ADVIL;MOTRIN) 800 MG tablet Take 1 tablet by mouth every 6 hours as needed for Pain 15 tablet 0    acetaminophen (TYLENOL) 325 MG tablet Take 2 tablets by mouth every 4 hours as needed for Pain or Fever 120 tablet 0     No current facility-administered medications for this visit. Mental Status Exam:   Appearance: Neatly groomed, appropriately dressed   Gait:wnl  Behavior: Cooperative and Appropriate   Eye Contact: Maintained good eye contact   Psychomotor Activity: Normal   Speech: Rate, volume, and prosody were normal   Mood: Ok   Affect: Full range, appropriate, and mood congruent   Thought Process: Goal directed and coherent   Thought content: The thought content was appropriate to questions. The patient denies any suicidal ideation or homicidal ideation.    Perceptions: Perceptions were normal, the patient denied any auditory, tactile or visual hallucinations   Cognition: Patient is

## 2020-03-14 ENCOUNTER — HOSPITAL ENCOUNTER (EMERGENCY)
Age: 35
Discharge: HOME | End: 2020-03-14
Attending: EMERGENCY MEDICINE
Payer: MEDICAID

## 2020-03-14 VITALS
DIASTOLIC BLOOD PRESSURE: 73 MMHG | OXYGEN SATURATION: 97 % | HEIGHT: 71 IN | WEIGHT: 204 LBS | RESPIRATION RATE: 16 BRPM | HEART RATE: 84 BPM | SYSTOLIC BLOOD PRESSURE: 119 MMHG | BODY MASS INDEX: 28.56 KG/M2 | TEMPERATURE: 97.8 F

## 2020-03-14 LAB
FLU A ANTIGEN: POSITIVE
FLU B ANTIGEN: NEGATIVE

## 2020-03-14 PROCEDURE — 99213 OFFICE O/P EST LOW 20 MIN: CPT

## 2020-03-14 PROCEDURE — 87804 INFLUENZA ASSAY W/OPTIC: CPT

## 2020-03-14 PROCEDURE — 99213 OFFICE O/P EST LOW 20 MIN: CPT | Performed by: EMERGENCY MEDICINE

## 2020-03-14 RX ORDER — DEXTROMETHORPHAN HYDROBROMIDE AND PROMETHAZINE HYDROCHLORIDE 15; 6.25 MG/5ML; MG/5ML
5 SYRUP ORAL 4 TIMES DAILY PRN
Qty: 120 ML | Refills: 0 | Status: SHIPPED | OUTPATIENT
Start: 2020-03-14 | End: 2020-03-19

## 2020-03-14 RX ORDER — IBUPROFEN 600 MG/1
600 TABLET ORAL EVERY 6 HOURS PRN
Qty: 20 TABLET | Refills: 0 | Status: SHIPPED | OUTPATIENT
Start: 2020-03-14 | End: 2021-04-14

## 2020-03-14 RX ORDER — OSELTAMIVIR PHOSPHATE 75 MG/1
75 CAPSULE ORAL 2 TIMES DAILY
Qty: 10 CAPSULE | Refills: 0 | Status: SHIPPED | OUTPATIENT
Start: 2020-03-14 | End: 2020-03-19

## 2020-03-14 ASSESSMENT — ENCOUNTER SYMPTOMS
BACK PAIN: 0
SORE THROAT: 1
TROUBLE SWALLOWING: 0
ABDOMINAL PAIN: 0
DIARRHEA: 0
EYE PAIN: 0
STRIDOR: 0
SINUS PRESSURE: 0
COUGH: 1
EYE DISCHARGE: 0
RHINORRHEA: 1
NAUSEA: 0
EYE REDNESS: 0
SHORTNESS OF BREATH: 0
VOMITING: 0
VOICE CHANGE: 0
WHEEZING: 0

## 2020-03-14 ASSESSMENT — PAIN SCALES - GENERAL: PAINLEVEL_OUTOF10: 4

## 2020-03-14 NOTE — ED NOTES
Pt. Released in stable condition, ambulated per self to private car. Instructed pt to follow-up with family doctor as needed for recheck or go directly to the emergency department for any concerns/worsening conditions. Pt. Verbalized understanding of instructions. No questions at this time. RX in hand.       Dunia Quezada RN  03/14/20 5972

## 2020-03-14 NOTE — ED PROVIDER NOTES
[id number]       Chief Complaint   Patient presents with    Fever     body aches, cough, chills       Nurses Notes reviewed and I agree except as noted in the HPI. HISTORY OF PRESENT ILLNESS   Sweta Masters is a 30 y.o. male who presents with 24-hour history of fever to 101, headache, cough, congestion, postnasal drainage, muscle aches, fatigue. He rates muscle aches at 4-10 in severity. 2 family members with influenza A. No chest pain, shortness of breath, abdominal pain, vomiting, dizziness, syncope, rash,  symptoms. No history of asthma or diabetes. No flu vaccine    REVIEW OF SYSTEMS     Review of Systems   Constitutional: Positive for appetite change, chills, fatigue and fever. Negative for unexpected weight change. HENT: Positive for congestion, postnasal drip, rhinorrhea and sore throat. Negative for ear discharge, ear pain, sinus pressure, sneezing, trouble swallowing and voice change. Eyes: Negative for pain, discharge and redness. Respiratory: Positive for cough. Negative for shortness of breath, wheezing and stridor. Cardiovascular: Negative for chest pain and leg swelling. Gastrointestinal: Negative for abdominal pain, diarrhea, nausea and vomiting. Genitourinary: Negative for dysuria, frequency, hematuria and urgency. Musculoskeletal: Positive for myalgias. Negative for arthralgias, back pain and neck pain. Skin: Negative for rash. Neurological: Negative for dizziness, syncope, weakness and headaches. Hematological: Negative for adenopathy. Psychiatric/Behavioral: Negative for behavioral problems, confusion, sleep disturbance and suicidal ideas. The patient is not nervous/anxious. All other systems reviewed and are negative.       PAST MEDICAL HISTORY         Diagnosis Date    Anxiety     Bipolar disorder ([facility]     PTSD (post-traumatic stress disorder)     Sub[facility]        SURGICAL and external ear normal.      Left Ear: Tympanic membrane and external ear normal.      Nose: Congestion and rhinorrhea present. Right Sinus: No maxillary sinus tenderness or frontal sinus tenderness. Left Sinus: No maxillary sinus tenderness or frontal sinus tenderness. Mouth/Throat:      Pharynx: No oropharyngeal exudate. Comments: Pharyngeal erythema  Eyes:      General: No scleral icterus. Right eye: No discharge. Left eye: No discharge. Extraocular Movements:      Right eye: Normal extraocular motion. Left eye: Normal extraocular motion. Conjunctiva/sclera: Conjunctivae normal.      Pupils: Pupils are equal, round, and reactive to light. Comments: Conjunctiva clear   Neck:      Musculoskeletal: Normal range of motion. Thyroid: No thyromegaly. Vascular: No JVD. Comments: No meningismus  Cardiovascular:      Rate and Rhythm: Normal rate and regular rhythm. Pulses: Normal pulses. Heart sounds: Normal heart sounds, S1 normal and S2 normal. No murmur. No friction rub. No gallop. Pulmonary:      Effort: Pulmonary effort is normal. No tachypnea or respiratory distress. Breath sounds: Normal breath sounds. No stridor. No decreased breath sounds, wheezing, rhonchi or rales. Comments: Dry cough, lungs clear  Chest:      Chest wall: No tenderness. Abdominal:      General: Bowel sounds are normal. There is no distension. Palpations: Abdomen is soft. There is no mass. Tenderness: There is no abdominal tenderness. There is no right CVA tenderness, left CVA tenderness, guarding or rebound. Comments: Nontender   Musculoskeletal: Normal range of motion. General: No tenderness. Right lower leg: Normal.      Left lower leg: Normal.   Lymphadenopathy:      Cervical: Cervical adenopathy present. Right cervical: Superficial cervical adenopathy present. No deep or posterior cervical adenopathy.      Left cervical: Superficial cervical adenopathy present. No deep or posterior cervical adenopathy. Skin:     General: Skin is warm and dry. Findings: No erythema or rash. Comments: No rash or bruising   Neurological:      Mental Status: He is alert and oriented to person, place, and time. Cranial Nerves: No cranial nerve deficit. Motor: No abnormal muscle tone. Coordination: Coordination normal.      Deep Tendon Reflexes: Reflexes are normal and symmetric. Reflexes normal.      Comments: Appropriate, no focal findings   Psychiatric:         Behavior: Behavior normal.         Thought Content: Thought content normal.         Judgment: Judgment normal.         DIAGNOSTIC RESULTS   Labs:   Results for orders placed or performed during the hospital encounter of 03/14/20   Rapid influenza A/B antigens   Result Value Ref Range    Flu A Antigen POSITIVE (A) NEGATIVE    Flu B Antigen NEGATIVE NEGATIVE       IMAGING:  No orders to display     URGENT CARE COURSE:     Vitals:    03/14/20 1512   BP: 119/73   Pulse: 84   Resp: 16   Temp: 97.8 °F (36.6 °C)   SpO2: 97%   Weight: 204 lb (92.5 kg)   Height: 5' 10.5\" (1.791 m)       Medications - No data to display  PROCEDURES:  None  FINALIMPRESSION      1. Influenza A    2. Acute febrile illness    3. Tobacco use disorder        DISPOSITION/PLAN   DISPOSITION Decision To Discharge 03/14/2020 04:01:39 PM  Nontoxic, well-hydrated, normal airway. No airway abscess or epiglottitis, sepsis, CNS infection, pneumonia, hypoxia, bronchospasm. No ACS, CHF, PE. Rapid influenza A positive. No influenza complications. No bacterial infection. Will treat with Tamiflu, Phenergan DM, Motrin, Tylenol, increased oral clear liquids, vaporizer, rest.  Patient to recheck with PCP in 5 days if problems persist, and he understands to go to ED if worse.   PATIENT REFERRED TO:  Bianca Alexandre MD  [address] [facility]  [address]  [address]  [phone]    Schedule an

## 2020-03-14 NOTE — LETTER
[address] [facility]  [address] [place] [id number]  Phone: [phone]               March 14, 2020    Patient: Beto Fernandez   YOB: 1985   Date of Visit: 3/14/2020       To Whom It May Concern:    Hernandez Cervantes was seen and treated in our emergency department on 3/14/2020. He may return to work on 3/18/2020.   No work March 14 to March 17, 2020      Sincerely,       Audrey Roberson MD         Signature:__________________________________

## 2020-07-19 ENCOUNTER — HOSPITAL ENCOUNTER (EMERGENCY)
Age: 35
Discharge: OTHER INSTITUTION | End: 2020-07-20
Attending: FAMILY MEDICINE
Payer: MEDICAID

## 2020-07-19 LAB
AMPHETAMINE+METHAMPHETAMINE URINE SCREEN: NEGATIVE
BARBITURATE QUANTITATIVE URINE: NEGATIVE
BENZODIAZEPINE QUANTITATIVE URINE: NEGATIVE
CANNABINOID QUANTITATIVE URINE: POSITIVE
COCAINE METABOLITE QUANTITATIVE URINE: NEGATIVE
OPIATES, URINE: NEGATIVE
OXYCODONE: NEGATIVE
PHENCYCLIDINE QUANTITATIVE URINE: NEGATIVE

## 2020-07-19 PROCEDURE — G0480 DRUG TEST DEF 1-7 CLASSES: HCPCS

## 2020-07-19 PROCEDURE — 80307 DRUG TEST PRSMV CHEM ANLYZR: CPT

## 2020-07-19 PROCEDURE — 99283 EMERGENCY DEPT VISIT LOW MDM: CPT

## 2020-07-19 PROCEDURE — 82248 BILIRUBIN DIRECT: CPT

## 2020-07-19 PROCEDURE — 80053 COMPREHEN METABOLIC PANEL: CPT

## 2020-07-19 PROCEDURE — 85025 COMPLETE CBC W/AUTO DIFF WBC: CPT

## 2020-07-19 PROCEDURE — 84443 ASSAY THYROID STIM HORMONE: CPT

## 2020-07-19 PROCEDURE — 36415 COLL VENOUS BLD VENIPUNCTURE: CPT

## 2020-07-19 RX ORDER — NICOTINE 21 MG/24HR
1 PATCH, TRANSDERMAL 24 HOURS TRANSDERMAL DAILY
Status: DISCONTINUED | OUTPATIENT
Start: 2020-07-20 | End: 2020-07-21 | Stop reason: HOSPADM

## 2020-07-19 ASSESSMENT — SLEEP AND FATIGUE QUESTIONNAIRES
AVERAGE NUMBER OF SLEEP HOURS: 6
DO YOU USE A SLEEP AID: NO

## 2020-07-19 ASSESSMENT — ENCOUNTER SYMPTOMS
ABDOMINAL PAIN: 0
SHORTNESS OF BREATH: 0

## 2020-07-19 ASSESSMENT — PATIENT HEALTH QUESTIONNAIRE - PHQ9: SUM OF ALL RESPONSES TO PHQ QUESTIONS 1-9: 13

## 2020-07-20 VITALS
TEMPERATURE: 99.4 F | BODY MASS INDEX: 27.92 KG/M2 | SYSTOLIC BLOOD PRESSURE: 106 MMHG | RESPIRATION RATE: 20 BRPM | HEIGHT: 70 IN | DIASTOLIC BLOOD PRESSURE: 66 MMHG | OXYGEN SATURATION: 98 % | HEART RATE: 60 BPM | WEIGHT: 195 LBS

## 2020-07-20 LAB
ACETAMINOPHEN LEVEL: < 5 UG/ML (ref 0–20)
ALBUMIN SERPL-MCNC: 4.5 G/DL (ref 3.5–5.1)
ALP BLD-CCNC: 65 U/L (ref 38–126)
ALT SERPL-CCNC: 15 U/L (ref 11–66)
ANION GAP SERPL CALCULATED.3IONS-SCNC: 9 MEQ/L (ref 8–16)
AST SERPL-CCNC: 16 U/L (ref 5–40)
BASOPHILS # BLD: 0.2 %
BASOPHILS ABSOLUTE: 0 THOU/MM3 (ref 0–0.1)
BILIRUB SERPL-MCNC: 0.3 MG/DL (ref 0.3–1.2)
BILIRUBIN DIRECT: < 0.2 MG/DL (ref 0–0.3)
BUN BLDV-MCNC: 15 MG/DL (ref 7–22)
CALCIUM SERPL-MCNC: 9.4 MG/DL (ref 8.5–10.5)
CHLORIDE BLD-SCNC: 103 MEQ/L (ref 98–111)
CO2: 28 MEQ/L (ref 23–33)
CREAT SERPL-MCNC: 1.2 MG/DL (ref 0.4–1.2)
EOSINOPHIL # BLD: 2 %
EOSINOPHILS ABSOLUTE: 0.2 THOU/MM3 (ref 0–0.4)
ERYTHROCYTE [DISTWIDTH] IN BLOOD BY AUTOMATED COUNT: 12.9 % (ref 11.5–14.5)
ERYTHROCYTE [DISTWIDTH] IN BLOOD BY AUTOMATED COUNT: 43.2 FL (ref 35–45)
ETHYL ALCOHOL, SERUM: < 0.01 %
GFR SERPL CREATININE-BSD FRML MDRD: 70 ML/MIN/1.73M2
GLUCOSE BLD-MCNC: 94 MG/DL (ref 70–108)
HCT VFR BLD CALC: 51 % (ref 42–52)
HEMOGLOBIN: 16.9 GM/DL (ref 14–18)
IMMATURE GRANS (ABS): 0.01 THOU/MM3 (ref 0–0.07)
IMMATURE GRANULOCYTES: 0.1 %
LYMPHOCYTES # BLD: 34.4 %
LYMPHOCYTES ABSOLUTE: 2.8 THOU/MM3 (ref 1–4.8)
MCH RBC QN AUTO: 30.7 PG (ref 26–33)
MCHC RBC AUTO-ENTMCNC: 33.1 GM/DL (ref 32.2–35.5)
MCV RBC AUTO: 92.6 FL (ref 80–94)
MONOCYTES # BLD: 8.3 %
MONOCYTES ABSOLUTE: 0.7 THOU/MM3 (ref 0.4–1.3)
NUCLEATED RED BLOOD CELLS: 0 /100 WBC
OSMOLALITY CALCULATION: 280 MOSMOL/KG (ref 275–300)
PLATELET # BLD: 239 THOU/MM3 (ref 130–400)
PMV BLD AUTO: 11.3 FL (ref 9.4–12.4)
POTASSIUM SERPL-SCNC: 4.3 MEQ/L (ref 3.5–5.2)
RBC # BLD: 5.51 MILL/MM3 (ref 4.7–6.1)
SALICYLATE, SERUM: < 0.3 MG/DL (ref 2–10)
SEG NEUTROPHILS: 55 %
SEGMENTED NEUTROPHILS ABSOLUTE COUNT: 4.4 THOU/MM3 (ref 1.8–7.7)
SODIUM BLD-SCNC: 140 MEQ/L (ref 135–145)
TOTAL PROTEIN: 6.9 G/DL (ref 6.1–8)
TSH SERPL DL<=0.05 MIU/L-ACNC: 3.6 UIU/ML (ref 0.4–4.2)
WBC # BLD: 8 THOU/MM3 (ref 4.8–10.8)

## 2020-07-20 RX ORDER — HALOPERIDOL 5 MG/ML
INJECTION INTRAMUSCULAR
Status: DISCONTINUED
Start: 2020-07-20 | End: 2020-07-20 | Stop reason: WASHOUT

## 2020-07-20 RX ORDER — DIPHENHYDRAMINE HYDROCHLORIDE 50 MG/ML
INJECTION INTRAMUSCULAR; INTRAVENOUS
Status: DISCONTINUED
Start: 2020-07-20 | End: 2020-07-20 | Stop reason: WASHOUT

## 2020-07-20 RX ORDER — DIPHENHYDRAMINE HCL 25 MG
TABLET ORAL
Status: DISCONTINUED
Start: 2020-07-20 | End: 2020-07-20 | Stop reason: WASHOUT

## 2020-07-20 RX ORDER — LORAZEPAM 2 MG/ML
INJECTION INTRAMUSCULAR
Status: DISCONTINUED
Start: 2020-07-20 | End: 2020-07-20 | Stop reason: WASHOUT

## 2020-07-20 NOTE — ED PROVIDER NOTES
[facility]  eMERGENCY dEPARTMENT eNCOUnter          CHIEF COMPLAINT       Chief Complaint   Patient presents with    Suicidal       Nurses Notes reviewed and I agree except as noted in the HPI. HISTORY OF PRESENT ILLNESS    Tae Kirkland is a 37 y.o. male who presents for depression with suicidal ideation    Location/Symptom: Depressed and suicidal  Timing/Onset: Over the last 3 weeks  Context/Setting: About 3 to 4 weeks ago bit of a domestic dispute occurred and he was arrested and charged with felony  He did spend some time in [place] and subsequently was staying with his dad locally  He has had chronic posttraumatic stress disorder bipolar disorder. He has been followed by psychiatry but currently has run out of his medicines and he is got a schedule appointment with psychiatry for follow-up  Quality: Been more anxious and nervous  Depressed tearful  Thoughts of dying and harming self  Poor sleep quality  Duration: Worsening over the last 3 weeks  Modifying Factors: None  Severity: 7/10    REVIEW OF SYSTEMS     Review of Systems   HENT: Negative for congestion. Respiratory: Negative for shortness of breath. Gastrointestinal: Negative for abdominal pain. Genitourinary: Negative for difficulty urinating. Musculoskeletal: Negative for joint swelling. Skin: Negative for rash and wound. Neurological: Negative for weakness and numbness. Psychiatric/Behavioral: Positive for dysphoric mood, sleep disturbance and suicidal ideas. The patient is nervous/anxious. PAST MEDICAL HISTORY    has a past medical history of Anxiety, Bipolar disorder ([address] PTSD (post-traumatic stress disorder), and Substance abuse ([facility] SURGICAL HISTORY      has a past surgical history that includes knee surgery and back surgery.     CURRENT MEDICATIONS       Previous Medications    ACETAMINOPHEN (TYLENOL) 325 MG TABLET    Take 2 tablets by mouth every 4 hours as needed for Pain or Fever IBUPROFEN (IBU) 600 MG TABLET    Take 1 tablet by mouth every 6 hours as needed for Pain or Fever    PRAZOSIN (MINIPRESS) 2 MG CAPSULE    Take 1 capsule by mouth nightly    PRAZOSIN (MINIPRESS) 5 MG CAPSULE    Take 1 capsule by mouth nightly    PSEUDOEPHEDRINE-APAP-DM (DAYQUIL MULTI-SYMPTOM COLD/FLU PO)    Take by mouth       ALLERGIES     is allergic to molds & smuts; mushroom extract complex; and pcn [penicillins]. FAMILY HISTORY     He indicated that his mother is alive. He indicated that his father is alive. He indicated that his maternal grandmother is alive. He indicated that his maternal grandfather is alive. family history includes Arthritis in his maternal grandmother; Asthma in his maternal grandfather and mother; Diabetes in his maternal grandfather and mother; Heart Disease in his father and maternal grandmother; High Blood Pressure in his father; High Cholesterol in his father; Other in his father, maternal grandfather, and mother. SOCIAL HISTORY      reports that he has been smoking cigarettes. He has a 7.50 pack-year smoking history. He has quit using smokeless tobacco. He reports current alcohol use of about 2.0 standard drinks of alcohol per week. He reports that he does not use drugs. PHYSICAL EXAM     INITIAL VITALS:  height is 5' 10\" (1.778 m) and weight is 195 lb (88.5 kg). His oral temperature is 99.4 °F (37.4 °C). His blood pressure is 152/96 (abnormal) and his pulse is 103. His respiration is 20 and oxygen saturation is 98%. Physical Exam  Vitals signs and nursing note reviewed. Constitutional:       Comments: GCS 15   HENT:      Head: Normocephalic and atraumatic. Eyes:      Extraocular Movements: Extraocular movements intact. Pupils: Pupils are equal, round, and reactive to light. Neck:      Musculoskeletal: Normal range of motion and neck supple. No neck rigidity. Cardiovascular:      Rate and Rhythm: Normal rate and regular rhythm.       Heart sounds: Normal heart sounds. Pulmonary:      Breath sounds: Wheezing present. Comments: Scattered wheeze but mostly clear  Musculoskeletal:         General: No swelling. Skin:     General: Skin is warm and dry. Neurological:      General: No focal deficit present. Mental Status: He is alert. Psychiatric:      Comments: General attitude and behavior: Alert, cooperative    Flow of conversation: Normal    Affect and mood: Depressed, down    Content of thought: Thoughts of harming self  Feeling of helplessness    Cognitive function: Good    Insight: Fair               DIFFERENTIAL DIAGNOSIS:     Depression, suicidal ideation        DIAGNOSTIC RESULTS       LABS:   Labs Reviewed   SALICYLATE LEVEL - Abnormal; Notable for the following components:       Result Value    Salicylate, Serum < 0.3 (*)     All other components within normal limits   GLOMERULAR FILTRATION RATE, ESTIMATED - Abnormal; Notable for the following components:    Est, Glom Filt Rate 70 (*)     All other components within normal limits   CBC WITH AUTO DIFFERENTIAL   BASIC METABOLIC PANEL   HEPATIC FUNCTION PANEL   ETHANOL   URINE DRUG SCREEN   ACETAMINOPHEN LEVEL   ANION GAP   OSMOLALITY   TSH WITH REFLEX       EMERGENCY DEPARTMENT COURSE:   Vitals:    Vitals:    07/19/20 2304   BP: (!) 152/96   Pulse: 103   Resp: 20   Temp: 99.4 °F (37.4 °C)   TempSrc: Oral   SpO2: 98%   Weight: 195 lb (88.5 kg)   Height: 5' 10\" (1.778 m)     Nursing notes reviewed.     COOPER consult    Drug screen positive for marijuana    EtOH, acetaminophen, salicylate negative    Normal CBC BMP liver panel    Patient is medically stable for psychiatric admission    At this point is recommended for psychiatric admission    We do not have psychiatric beds available here today    Attempts were made to make arrangements to transfer to another psychiatric facility    Will place in ED observation pending transfer    CRITICAL CARE:   none    CONSULTS:  COPOER    PROCEDURES:  None    FINAL IMPRESSION      1. Depression with suicidal ideation          DISPOSITION/PLAN     ED observation pending psychiatric transfer    PATIENT REFERRED TO:  No follow-up provider specified.     DISCHARGE MEDICATIONS:  New Prescriptions    No medications on file       (Please note that portions of this note were completed with a voice recognition program.  Efforts were made to edit the dictations but occasionally words are mis-transcribed.)    MD Ronnie Greenwood MD  07/20/20 4859

## 2020-07-20 NOTE — ED NOTES
[facility] notified for transport of patient to [facility].  ETA 20 mins     [facility]  07/20/20 9633

## 2020-07-20 NOTE — ED PROVIDER NOTES
Patient was signed out by my evening colleague. Patient required no medical attention during the time that he was here. Patient was accepted at [facility], however he does not want to go there and he wants to leave. I was told explicitly by my morning colleague that the patient should not be allowed to leave as he did have a plan to either shoot or hang himself. At this point I placed the patient under an [facility].      Nilson Israel, DO  07/20/20 0454
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
## 2020-07-20 NOTE — PROGRESS NOTES
Received a call from [address]  with [facility], she states they are unable to assist with transportation

## 2020-07-20 NOTE — PROGRESS NOTES
Per report from [facility], they are having trouble securing transportation for patient due to his insurance.  Clinician spoke with Meagan FATIMA with [facility] on situation and they will have the transport team see what they can do

## 2020-07-20 NOTE — ED NOTES
Pt resting in bed in a left side position at this time. Pt denies pain at this time. Pt vital signs stable and respirations unlabored. Pt has constant observer present.         Evette Merino RN  07/20/20 2594

## 2020-07-20 NOTE — PROGRESS NOTES
Call from [place] with [address]  [place]  Needs back of [facility] completed. Accepting Information    Dr. Bard Wong  4th floor  Report # [phone]    Charge [address] & 3[address] Floors informed of this information.
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
## 2020-07-20 NOTE — ED NOTES
[facility] called to set up transport for patient.  States will call back once they have an [address], [address]  07/20/20 4159

## 2020-07-20 NOTE — PROGRESS NOTES
Back of [facility] faxed to [facility] at [phone].  Timbo Mascorro with [facility] informed

## 2020-07-20 NOTE — ED NOTES
Charge RN speaking with [facility] calling about transport for patient at this time      Lennox Ehrlich, [facility]  07/20/20 1527

## 2021-04-14 ENCOUNTER — HOSPITAL ENCOUNTER (EMERGENCY)
Age: 36
Discharge: HOME | End: 2021-04-14
Attending: EMERGENCY MEDICINE
Payer: MEDICAID

## 2021-04-14 VITALS
SYSTOLIC BLOOD PRESSURE: 129 MMHG | RESPIRATION RATE: 16 BRPM | OXYGEN SATURATION: 96 % | TEMPERATURE: 97 F | WEIGHT: 230 LBS | HEART RATE: 65 BPM | BODY MASS INDEX: 33 KG/M2 | DIASTOLIC BLOOD PRESSURE: 87 MMHG

## 2021-04-14 DIAGNOSIS — R55 VASOVAGAL NEAR SYNCOPE: Primary | ICD-10-CM

## 2021-04-14 DIAGNOSIS — M26.629 TMJ PAIN DYSFUNCTION SYNDROME: ICD-10-CM

## 2021-04-14 PROCEDURE — 99213 OFFICE O/P EST LOW 20 MIN: CPT | Performed by: EMERGENCY MEDICINE

## 2021-04-14 PROCEDURE — 99213 OFFICE O/P EST LOW 20 MIN: CPT

## 2021-04-14 RX ORDER — LAMOTRIGINE 200 MG/1
200 TABLET ORAL DAILY
COMMUNITY

## 2021-04-14 RX ORDER — OLANZAPINE 5 MG/1
5 TABLET ORAL 2 TIMES DAILY
COMMUNITY

## 2021-04-14 RX ORDER — IBUPROFEN 600 MG/1
600 TABLET ORAL EVERY 8 HOURS PRN
Qty: 30 TABLET | Refills: 0 | Status: SHIPPED | OUTPATIENT
Start: 2021-04-14 | End: 2022-09-02

## 2021-04-14 RX ORDER — BUSPIRONE HYDROCHLORIDE 5 MG/1
5 TABLET ORAL 2 TIMES DAILY
COMMUNITY
End: 2022-09-02 | Stop reason: ALTCHOICE

## 2021-04-14 ASSESSMENT — ENCOUNTER SYMPTOMS
COLOR CHANGE: 0
VOMITING: 0
SINUS PAIN: 0
PHOTOPHOBIA: 0
SORE THROAT: 0
RHINORRHEA: 0
NAUSEA: 0
SINUS PRESSURE: 1
DIARRHEA: 0
SHORTNESS OF BREATH: 0
ABDOMINAL PAIN: 0
COUGH: 1
BACK PAIN: 0

## 2021-04-14 NOTE — ED TRIAGE NOTES
Patients states Friday, pressure behind rt. Ear and jaw, sinus congestion, today light sensitive, dizzy,  C/o more of pressure below right ear. Tylenol, ibuprofen taken.

## 2021-04-14 NOTE — ED PROVIDER NOTES
[phone]       Chief Complaint   Patient presents with    Otalgia     rt. ear pressure, jaw pain below ear, dizziness    Sinus Problem     pressure       Nurses Notes reviewed and I agree except as noted in the HPI. HISTORY OF PRESENT ILLNESS   Sander Sanders is a 37 y.o. male who presents to urgent care for evaluation of ear pain, jaw popping and dizziness. Patient reports that his jaw has been popping for years now. He complains of some pressure behind his jawbone. Patient reports that he has been taking ibuprofen and Tylenol for this. He reports that this is relieving his symptoms slightly. Patient also reports that he got dizzy at work yesterday. He felt \"disoriented, in a cold sweat, dizzy and nauseous\". Patient is stated that his coworker said that he looked like he was white as a ghost.  He states that after resting he felt better. He feels that the symptoms are improving today, however he still feels dizzy on standing and slightly lightheaded. Patient denied having any fever, chills, change in his vision, chest pain abdominal pain, nausea or vomiting today. REVIEW OF SYSTEMS     Review of Systems   Constitutional: Negative for chills, diaphoresis and fever. HENT: Positive for congestion and sinus pressure. Negative for postnasal drip, rhinorrhea, sinus pain and sore throat. Eyes: Negative for photophobia and visual disturbance. Respiratory: Positive for cough. Negative for shortness of breath. Cardiovascular: Negative for chest pain and palpitations. Gastrointestinal: Negative for abdominal pain, diarrhea, nausea and vomiting. Musculoskeletal: Negative for back pain and neck pain. Skin: Negative for color change and rash. Neurological: Positive for dizziness and light-headedness. Negative for weakness, numbness and headaches.        PAST MEDICAL HISTORY         Diagnosis Date    Anxiety     Bipolar disorder ([facility]     PTSD (post-traumatic stress disorder)     Substance abuse ([facility]        SURGICAL HISTORY     Patient  has a past surgical history that includes knee surgery and back surgery. CURRENT MEDICATIONS       Discharge Medication List as of 4/14/2021  7:27 PM      CONTINUE these medications which have NOT CHANGED    Details   lamoTRIgine (LAMICTAL) 200 MG tablet Take 200 mg by mouth dailyHistorical Med      OLANZapine (ZYPREXA) 5 MG tablet Take 5 mg by mouth 2 times daily prnHistorical Med      busPIRone (BUSPAR) 5 MG tablet Take 5 mg by mouth 2 times dailyHistorical Med      prazosin (MINIPRESS) 5 MG capsule Take 1 capsule by mouth nightly, Disp-30 capsule, R-3Normal      acetaminophen (TYLENOL) 325 MG tablet Take 2 tablets by mouth every 4 hours as needed for Pain or Fever, Disp-120 tablet, R-0OTC             ALLERGIES     Patient is is allergic to molds & smuts; mushroom extract complex; and pcn [penicillins]. FAMILY HISTORY     Patient'sfamily history includes Arthritis in his maternal grandmother; Asthma in his maternal grandfather and mother; Diabetes in his maternal grandfather and mother; Heart Disease in his father and maternal grandmother; High Blood Pressure in his father; High Cholesterol in his father; Other in his father, maternal grandfather, and mother. SOCIAL HISTORY     Patient  reports that he has been smoking cigarettes. He has a 7.50 pack-year smoking history. He has quit using smokeless tobacco. He reports current alcohol use of about 2.0 standard drinks of alcohol per week. He reports current drug use. Drug: Marijuana. PHYSICAL EXAM     ED TRIAGE VITALS  BP: 129/87, Temp: 97 °F (36.1 °C), Pulse: 65, Resp: 16, SpO2: 96 %  Physical Exam  Vitals signs and nursing note reviewed. Constitutional:       General: He is not in acute distress. Appearance: He is obese. He is not ill-appearing. HENT:      Head: Normocephalic and atraumatic.       Right Ear: Tympanic membrane, ear canal and external ear normal.      Left Ear: Tympanic membrane, ear canal and external ear normal.      Nose: Nose normal. No congestion or rhinorrhea. Mouth/Throat:      Mouth: Mucous membranes are moist.      Pharynx: Oropharynx is clear. No oropharyngeal exudate or posterior oropharyngeal erythema. Eyes:      General:         Right eye: No discharge. Left eye: No discharge. Extraocular Movements: Extraocular movements intact. Conjunctiva/sclera: Conjunctivae normal.   Neck:      Musculoskeletal: Normal range of motion and neck supple. No muscular tenderness. Cardiovascular:      Rate and Rhythm: Normal rate and regular rhythm. Heart sounds: Normal heart sounds. No murmur. Pulmonary:      Effort: Pulmonary effort is normal. No respiratory distress. Breath sounds: Normal breath sounds. No wheezing, rhonchi or rales. Musculoskeletal:      Right lower leg: No edema. Left lower leg: No edema. Lymphadenopathy:      Cervical: No cervical adenopathy. Skin:     General: Skin is warm and dry. Capillary Refill: Capillary refill takes less than 2 seconds. Findings: No erythema or rash. Neurological:      General: No focal deficit present. Mental Status: He is alert and oriented to person, place, and time. Psychiatric:         Mood and Affect: Mood normal.         Behavior: Behavior normal.         DIAGNOSTIC RESULTS   Labs:No results found for this visit on 04/14/21. IMAGING:  No orders to display     URGENT CARE COURSE:     Vitals:    04/14/21 1853   BP: 129/87   Pulse: 65   Resp: 16   Temp: 97 °F (36.1 °C)   TempSrc: Temporal   SpO2: 96%   Weight: 230 lb (104.3 kg)       Medications - No data to display  PROCEDURES:  FINALIMPRESSION      1. Vasovagal near syncope    2. TMJ pain dysfunction syndrome        DISPOSITION/PLAN   DISPOSITION Decision To Discharge 04/14/2021 07:22:24 PM    Patient was seen and evaluated here in the urgent care.

## 2021-07-13 ENCOUNTER — HOSPITAL ENCOUNTER (OUTPATIENT)
Dept: OCCUPATIONAL THERAPY | Age: 36
Setting detail: THERAPIES SERIES
Discharge: HOME | End: 2021-07-13
Payer: COMMERCIAL

## 2021-07-13 PROCEDURE — 97165 OT EVAL LOW COMPLEX 30 MIN: CPT

## 2021-07-13 NOTE — PROGRESS NOTES
** PLEASE SIGN, DATE AND TIME CERTIFICATION BELOW AND RETURN TO [facility] OUTPATIENT REHABILITATION (FAX #: [phone]). ATTEST/CO-SIGN IF ACCESSING VIA [employer]. THANK YOU.**    I certify that I have examined the patient below and determined that Physical Medicine and Rehabilitation service is necessary and that I approve the established plan of care for up to 90 days or as specifically noted.   Attestation, signature or co-signature of physician indicates approval of certification requirements.    ________________________ ____________ __________  Physician Signature   Date   Time   [address] THERAPY  [x] EVALUATION  [] DAILY NOTE (LAND) [] DAILY NOTE (AQUATIC ) [] PROGRESS NOTE [] DISCHARGE NOTE    [x] [facility]   [] [facility]    [] [address] [facility]   [] [facility]    Date: 2021  Patient Name:  Valeriy Sierra  : 1985  MRN: [id number]  CSN: [id number]    Referring Practitioner Sarah Pierson, APR*   Diagnosis Unspecified sprain of right elbow, initial encounter [S53.401A]  Strain of unspecified muscles, fascia and tendons at forearm level, right arm, initial encounter [S56.911A]    Treatment Diagnosis Unspecified sprain of right elbow, initial encounter [S53.401A]  Strain of unspecified muscles, fascia and tendons at forearm level, right arm, initial encounter [T57.505A]   Date of Evaluation 21      Functional Outcome Measure Used Upper Extremity Functional Scale   Functional Outcome Score 53/80 (21)       Insurance: Primary: Payor: OUMAR LEATHA /  /  / ,   Secondary:    Authorization Information: PRE CERTIFICATION REQUIRED: YES, APPROVED FOR PT/OT TOTAL OF 12 VISITS, 21 TO 21  INSURANCE THERAPY BENEFIT:     AQUATIC THERAPY COVERED: NO  MODALITIES COVERED:  YES, NO IONTO    Visit # 1, 1/10 for progress note   Visits Allowed: 12   Recertification Date:    Physician Follow-Up: 7/15/21   Physician Orders: Elinor Recinos and treat   Pertinent History: Patient's injury happened 5/26/21 while at work doing lifting of [employer] parts. He reports he felt a pop in his elbow  Patient had MRI at [facility] on [address] to get results. Other PMH includes bipolar and anxiety disorder. SUBJECTIVE: Patient reports he was told he had a torn tendon in his forearm but unsure of further details. Goes by Barbara Scott. Social/Functional History:  Medications and Allergies have been reviewed and are listed on the Medical History Questionnaire      Task Prior Level of Function  (current level of function addressed below)   ADLs  Independent   Ambulation Independent   Transfers Independent   Hobbies Mowing the lawn, yardwork. Driving Active    Work [employer]. 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  Currently working light duty in [employer] office     OBJECTIVE:  Hand Dominance right handed   Palpation Tender and painful to palplation at R lateral epicondyle   Observation    Posture    Edema None noted   Special Tests Positive long finger test, pain with resisted wrist extension and supination       ADL's Patient reports pain with lifting, gripping, doing laundry. Sensation Right Upper Extremity: Intact.    Coordination WFL           RIGHT UPPER EXTREMITY  RANGE OF MOTION    AROM PROM COMMENTS         Shoulder Flexion      Shoulder Extension      Shoulder Abduction      Shoulder Adduction      Shoulder External Rotation      Shoulder Internal Rotation      Shoulder Range of Motion is [place]  []      Elbow Flexion WFL     Elbow Extension WFL  Pain reported at end range   Forearm Pronation WFL     Forearm Supination WFL     Elbow Range of Motion is [place]  []      Wrist Flexion   45 degrees with elbow at 90, 25 degrees with elbow in full extension   Wrist Extension WFL     Wrist Radial Deviation WFL     Wrist Ulnar Deviation WFL     Wrist Range of Motion is [place]  []   If no measurement is recorded, no formal assessment was completed for that motion. RIGHT UPPER EXTREMITY  STRENGTH    Strength Rating Comments   Shoulder Flexion     Shoulder Extension     Shoulder Abduction     Shoulder Adduction     Shoulder External Rotation     Shoulder Internal Rotation     [x]  Shoulder Strength is grossly WFL. Elbow Flexion     Elbow Extension     Forearm Pronation     Forearm Supination     [x] Elbow Strength is grossly WFL. Wrist Flexion     Wrist Extension     Wrist Radial Deviation     Wrist Ulnar Deviation     [x]  Wrist Strength is grossly WFL. Right Left    Strength Settin elbow at side 48# 82#    Settin in loaded position 23# 71#               If no ratings are recorded, no formal assessment was completed. TREATMENT   Precautions:    Pain: 4/10 lateral and posterior elbow     X in shaded column indicates Activity Completed Today   Modalities Parameters/  Location  Notes/Comments   Initiated pulsed US, 0.8 w/cm2, 50%, 1 MHz, stopped after 3 minutes due to pain report  X Sent script with patient to MD office for dexamethasone to initiate iontophoresis next session. Manual Therapy Time/  Technique  Notes/Comments                     Exercises   Sets/  Sec Reps  Notes/Comments   AROM elbow flexion and extension 1 10 x    AROM supination/pronation elbow at side 1 10 X    AROM wrist flexion and extension elbow at side 1 10 X                                Activities Time    Notes/Comments                       Specific Interventions Next Treatment: iontophoresis, IASTM, pain-free ROM    Activity/Treatment Tolerance:  []  Patient tolerated treatment well  []  Patient limited by fatigue  [x]  Patient limited by pain   []  Patient limited by other medical complications  []  Other:     Assessment: Patient presents with recent onset of pain after lifting repetitively at work, causing injury.   Needs skilled OT to improve ROM, pain reduction, and improved strength for RTW tasks. Areas for Improvement: impaired activity tolerance, impaired ROM, impaired strength and pain  Prognosis: good    GOALS:  Patient Goal: reduce pain    Short Term Goals:  Time Frame: 4 weeks  *  Patient will demonstrate AROM wrist flexion in elbow extended position to 35 degrees to improve soft tissue length for better flexibility for self care. * Patient will report pain with AROM of R arm to no more than 2/10 for improved tolerance for lifting. * Patient will demonstrate I knowledge of HEP for improved flexibility and eventual strength for lifting. Long Term Goals:  Time Frame: 12 weeks  *  Patient will improve UEFS to at least 62. *  Patient will report no more than 2/10 pain with pushing up from a chair. * Patient will demonstrate at least 30# of  R hand in loaded position for improved strength for RTW tasks. Patient Education:   [x]  HEP/Education Completed: Plan of Care, Goals, HEP   [facility] Access Code for HEP: AROM elbow, forearm and wrist in pain-free ROM  []  No new Education completed  []  Reviewed Prior HEP      [x]  Patient verbalized and/or demonstrated understanding of education provided. []  Patient unable to verbalize and/or demonstrate understanding of education provided. Will continue education. [x]  Barriers to learning: none    PLAN:  Treatment Recommendations: Range of Motion, Manual Therapy - Soft Tissue Mobilization, Manual Therapy - Joint Manipulation, Pain Management, Home Exercise Program, Patient Education, Integrative Dry Needling and Modalities    [x]  Plan of care initiated. Plan to see patient 2 times per week for 12 weeks to address the treatment planned outlined above.   []  Continue with current plan of care  []  Modify plan of care as follows:    []  Hold pending physician visit  []  Discharge    Time In 1125   Time Out 1215   Timed Code Minutes: 7 min   Total Treatment Time: 50 min       Electronically Signed by:   Juan CALLAWAY/JEFFRY FIELDS

## 2021-07-15 ENCOUNTER — HOSPITAL ENCOUNTER (OUTPATIENT)
Dept: OCCUPATIONAL THERAPY | Age: 36
Setting detail: THERAPIES SERIES
Discharge: HOME | End: 2021-07-15
Payer: COMMERCIAL

## 2021-07-15 PROCEDURE — 97140 MANUAL THERAPY 1/> REGIONS: CPT

## 2021-07-15 PROCEDURE — 97110 THERAPEUTIC EXERCISES: CPT

## 2021-07-19 ENCOUNTER — APPOINTMENT (OUTPATIENT)
Dept: OCCUPATIONAL THERAPY | Age: 36
End: 2021-07-19
Payer: COMMERCIAL

## 2021-07-27 ENCOUNTER — HOSPITAL ENCOUNTER (OUTPATIENT)
Dept: OCCUPATIONAL THERAPY | Age: 36
Setting detail: THERAPIES SERIES
Discharge: HOME | End: 2021-07-27
Payer: COMMERCIAL

## 2021-07-27 PROCEDURE — 97110 THERAPEUTIC EXERCISES: CPT

## 2021-07-27 PROCEDURE — 97033 APP MDLTY 1+IONTPHRSIS EA 15: CPT

## 2021-07-27 PROCEDURE — 97140 MANUAL THERAPY 1/> REGIONS: CPT

## 2021-07-27 NOTE — PROGRESS NOTES
X in shaded column indicates Activity Completed Today   Modalities Parameters/  Location  Notes/Comments   Iontophoresis with dexamethasone 0.4%, 40 dose, 2.5 mL, 3 mA x 12 minutes  X Tolerated well               Manual Therapy Time/  Technique  Notes/Comments   STM to lateral elbow, forearm, tricep  X  Educated patient in self massage to lateral elbow, cw/ccw, along length of extensors and perpendicular to extensors               Exercises   Sets/  Sec Reps  Notes/Comments   AROM elbow flexion and extension 1 10 x    AROM supination/pronation elbow at side 1 10 X    AROM wrist flexion and extension elbow at side 1 10 X    AAROM Supinator wheel for gentle elbow extension; wrist flex/ext 1 10e  Initiated this date, fair tolerance, mild hold at end range elbow extension with good tolerance. Tricep stretch 15 sec 3 X                  Activities Time    Notes/Comments                       Specific Interventions Next Treatment: iontophoresis, IASTM, pain-free ROM    Activity/Treatment Tolerance:  []  Patient tolerated treatment well  []  Patient limited by fatigue  [x]  Patient limited by pain   []  Patient limited by other medical complications  []  Other:     Assessment: Patient is slowly progressing towards his goals. 1st session of iontophoresis today    Areas for Improvement: impaired activity tolerance, impaired ROM, impaired strength and pain  Prognosis: good    GOALS:  Patient Goal: reduce pain    Short Term Goals:  Time Frame: 4 weeks  *  Patient will demonstrate AROM wrist flexion in elbow extended position to 35 degrees to improve soft tissue length for better flexibility for self care. * Patient will report pain with AROM of R arm to no more than 2/10 for improved tolerance for lifting. * Patient will demonstrate I knowledge of HEP for improved flexibility and eventual strength for lifting. Long Term Goals:  Time Frame: 12 weeks  *  Patient will improve UEFS to at least 62.   *  Patient will report no more than 2/10 pain with pushing up from a chair. * Patient will demonstrate at least 30# of  R hand in loaded position for improved strength for RTW tasks. Patient Education:   [x]  HEP/Education Completed: Plan of Care, Goals, HEP (see below)    AROM elbow, forearm and wrist in pain-free ROM, tricep stretch and self massage added 7/27  []  No new Education completed  []  Reviewed Prior HEP      [x]  Patient verbalized and/or demonstrated understanding of education provided. []  Patient unable to verbalize and/or demonstrate understanding of education provided. Will continue education. [x]  Barriers to learning: none    PLAN:  Treatment Recommendations: Range of Motion, Manual Therapy - Soft Tissue Mobilization, Manual Therapy - Joint Manipulation, Pain Management, Home Exercise Program, Patient Education, Integrative Dry Needling and Modalities    []  Plan of care initiated. Plan to see patient 2 times per week for 12 weeks to address the treatment planned outlined above.   [x]  Continue with current plan of care  []  Modify plan of care as follows:    []  Hold pending physician visit  []  Discharge    CPT CODE TIME-IN TIME-OUT TOTAL TIME   92984 1200 1215 15   [id number] 1215 1225 10   [id number] 1225 1240 15         TOTAL SESSION 1200 1240 40         Electronically Signed by:   Radha Elizabeth OTR/DIAMOND, CHT #[id number]

## 2021-07-29 ENCOUNTER — HOSPITAL ENCOUNTER (OUTPATIENT)
Dept: OCCUPATIONAL THERAPY | Age: 36
Setting detail: THERAPIES SERIES
Discharge: HOME | End: 2021-07-29
Payer: COMMERCIAL

## 2021-07-29 PROCEDURE — 97110 THERAPEUTIC EXERCISES: CPT

## 2021-07-29 PROCEDURE — 97140 MANUAL THERAPY 1/> REGIONS: CPT

## 2021-07-29 PROCEDURE — 97033 APP MDLTY 1+IONTPHRSIS EA 15: CPT

## 2021-07-29 NOTE — PROGRESS NOTES
[address]  [] EVALUATION  [x] DAILY NOTE (LAND) [] DAILY NOTE (AQUATIC ) [] PROGRESS NOTE [] DISCHARGE NOTE    [x] OUTPATIENT REHABILITATION [facility]   [] [place] [address]    [] [facility]   [] Germaine Owens    Date: 2021  Patient Name:  Carlos Manuel Jolley  : 1985  MRN: [id number]  CSN: [id number]    Referring Practitioner Grisel Hart, APR*   Diagnosis Unspecified sprain of right elbow, initial encounter [S53.401A]  Strain of unspecified muscles, fascia and tendons at forearm level, right arm, initial encounter [S56.911A]    Treatment Diagnosis Unspecified sprain of right elbow, initial encounter [S53.401A]  Strain of unspecified muscles, fascia and tendons at forearm level, right arm, initial encounter [C36.014X]   Date of Evaluation 21      Functional Outcome Measure Used Upper Extremity Functional Scale   Functional Outcome Score 53/80 (21)       Insurance: Primary: Payor: OUMAR SLOAN /  /  / ,   Secondary:    Authorization Information: PRE CERTIFICATION REQUIRED: YES, APPROVED FOR PT/OT TOTAL OF 12 VISITS, 21 TO 21  INSURANCE THERAPY BENEFIT:     AQUATIC THERAPY COVERED: NO  MODALITIES COVERED:  YES, JENA HAS BEEN APPROVED    Visit # 4, 4/10 for progress note   Visits Allowed: 12   Recertification Date:    Physician Follow-Up: 7/15/21   Physician Orders: Angéilca Wilkes and treat   Pertinent History: 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 He reports he felt a pop in his elbow  Patient had MRI at THE [facility] on [facility] [address] to get results. Other PMH includes bipolar and anxiety disorder. SUBJECTIVE: Patient reports increased pain and discomfort noted this date, with reference he is moving and trying to help when he can but reports \"I know I am doing too much\".  Patient reports help with heavy lifting, but reports light lifting is causing

## 2021-08-03 ENCOUNTER — HOSPITAL ENCOUNTER (OUTPATIENT)
Dept: OCCUPATIONAL THERAPY | Age: 36
Setting detail: THERAPIES SERIES
Discharge: HOME | End: 2021-08-03
Payer: COMMERCIAL

## 2021-08-03 PROCEDURE — 97140 MANUAL THERAPY 1/> REGIONS: CPT

## 2021-08-03 PROCEDURE — 97110 THERAPEUTIC EXERCISES: CPT

## 2021-08-03 PROCEDURE — 97033 APP MDLTY 1+IONTPHRSIS EA 15: CPT

## 2021-08-05 ENCOUNTER — HOSPITAL ENCOUNTER (OUTPATIENT)
Dept: OCCUPATIONAL THERAPY | Age: 36
Setting detail: THERAPIES SERIES
Discharge: HOME | End: 2021-08-05
Payer: COMMERCIAL

## 2021-08-05 PROCEDURE — 97110 THERAPEUTIC EXERCISES: CPT

## 2021-08-05 PROCEDURE — 97033 APP MDLTY 1+IONTPHRSIS EA 15: CPT

## 2021-08-05 PROCEDURE — 97140 MANUAL THERAPY 1/> REGIONS: CPT

## 2021-08-05 NOTE — PROGRESS NOTES
[address]  [] EVALUATION  [x] DAILY NOTE (LAND) [] DAILY NOTE (AQUATIC ) [] PROGRESS NOTE [] DISCHARGE NOTE    [x] OUTPATIENT REHABILITATION [facility]   [] [place] [address]    [] [facility]   [] [facility]    Date: 2021  Patient Name:  Samuel Brock  : 1985  MRN: [id number]  CSN: [id number]    Referring Practitioner Prabha Caballero, APR*   Diagnosis Unspecified sprain of right elbow, initial encounter [S53.401A]  Strain of unspecified muscles, fascia and tendons at forearm level, right arm, initial encounter [S56.911A]    Treatment Diagnosis Unspecified sprain of right elbow, initial encounter [S53.401A]  Strain of unspecified muscles, fascia and tendons at forearm level, right arm, initial encounter [E64.151B]   Date of Evaluation 21      Functional Outcome Measure Used Upper Extremity Functional Scale   Functional Outcome Score 53/80 (21)       Insurance: Primary: Payor: OUMAR SLOAN /  /  / ,   Secondary:    Authorization Information: PRE CERTIFICATION REQUIRED: YES, APPROVED FOR PT/OT TOTAL OF 12 VISITS, 21 TO 21  INSURANCE THERAPY BENEFIT:     AQUATIC THERAPY COVERED: NO  MODALITIES COVERED:  YES, [address]    Visit # 6, 6/10 for progress note   Visits Allowed: 12   Recertification Date:    Physician Follow-Up: 7/15/21   Physician Orders: Parish Mota and treat   Pertinent History: 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 He reports he felt a pop in his elbow  Patient had MRI at THE [facility] on [facility] [address] to get results. Other PMH includes bipolar and anxiety disorder. SUBJECTIVE: Patient reports decrease in household activities, with continued \"dull achy pain\" with occasional \"sharp shooting 5/10 pain\". Patient reports (-) sleep disturbances if taken OTC medication before bed. Goes by David Mcneill.     TREATMENT   Precautions: Pain:at rest 1-2/10     X in shaded column indicates Activity Completed Today   Modalities Parameters/  Location  Notes/Comments   Iontophoresis with dexamethasone 0.4%, 40 dose, 2.5 mL, 3.5 mA x 12 minutes  X                Manual Therapy Time/  Technique  Notes/Comments   IASTM/Astym to lateral elbow, forearm extensors, tricep 10-15 min X                 Exercises   Sets/  Sec Reps  Notes/Comments   AROM elbow flexion and extension 1 10 x    AROM supination/pronation elbow at side 1 10 X    AROM wrist flexion and extension elbow at side 1 10 X    AAROM Supinator wheel for gentle elbow extension; wrist flex/ext 1 10 X Gentle     Tricep stretch 15 sec 3 X    Gentle stretching wrist flexion with elbow bent at 90, forearm neutral and pronated 30 sec 3 each position            Activities Time    Notes/Comments                       Specific Interventions Next Treatment: iontophoresis, IASTM, pain-free ROM    Activity/Treatment Tolerance:  [x]  Patient tolerated treatment well  []  Patient limited by fatigue  []  Patient limited by pain   []  Patient limited by other medical complications  []  Other:     Assessment: Patient is slowly progressing towards his goals. Decreased pain noted this date, with continued discomfort and tenderness over lateral elbow. Good tolerance with IASTM. Areas for Improvement: impaired activity tolerance, impaired ROM, impaired strength and pain  Prognosis: good    GOALS:  Patient Goal: reduce pain    Short Term Goals:  Time Frame: 4 weeks  *  Patient will demonstrate AROM wrist flexion in elbow extended position to 35 degrees to improve soft tissue length for better flexibility for self care. * Patient will report pain with AROM of R arm to no more than 2/10 for improved tolerance for lifting. * Patient will demonstrate I knowledge of HEP for improved flexibility and eventual strength for lifting.     Long Term Goals:  Time Frame: 12 weeks  *  Patient will improve UEFS to at least 62.  *  Patient will report no more than 2/10 pain with pushing up from a chair. * Patient will demonstrate at least 30# of  R hand in loaded position for improved strength for RTW tasks. Patient Education:   []  HEP/Education Completed: Plan of Care, Goals, HEP (see below)    AROM elbow, forearm and wrist in pain-free ROM, tricep stretch and self massage added 7/27  []  No new Education completed  [x]  Reviewed Prior HEP      [x]  Patient verbalized and/or demonstrated understanding of education provided. []  Patient unable to verbalize and/or demonstrate understanding of education provided. Will continue education. [x]  Barriers to learning: none    PLAN:  Treatment Recommendations: Range of Motion, Manual Therapy - Soft Tissue Mobilization, Manual Therapy - Joint Manipulation, Pain Management, Home Exercise Program, Patient Education, Integrative Dry Needling and Modalities    []  Plan of care initiated. Plan to see patient 2 times per week for 12 weeks to address the treatment planned outlined above. [x]  Continue with current plan of care  []  Modify plan of care as follows:    []  Hold pending physician visit  []  Discharge    CPT CODE TIME-IN TIME-OUT TOTAL TIME   55369 (Manual) 7190 0306 14   78008 (Th ex) [id number] 62   43084 (Ionto)  5203 2175 15         TOTAL SESSION [id number]         Electronically Signed by:   Carlos CLOUD #[id number]

## 2021-08-06 ENCOUNTER — HOSPITAL ENCOUNTER (EMERGENCY)
Age: 36
Discharge: HOME | End: 2021-08-06
Attending: EMERGENCY MEDICINE
Payer: MEDICAID

## 2021-08-06 ENCOUNTER — HOSPITAL ENCOUNTER (EMERGENCY)
Age: 36
Discharge: HOME | End: 2021-08-06
Payer: MEDICAID

## 2021-08-06 VITALS
OXYGEN SATURATION: 96 % | HEART RATE: 74 BPM | TEMPERATURE: 97.9 F | RESPIRATION RATE: 17 BRPM | DIASTOLIC BLOOD PRESSURE: 104 MMHG | SYSTOLIC BLOOD PRESSURE: 168 MMHG

## 2021-08-06 VITALS
SYSTOLIC BLOOD PRESSURE: 127 MMHG | HEART RATE: 74 BPM | DIASTOLIC BLOOD PRESSURE: 71 MMHG | BODY MASS INDEX: 35.87 KG/M2 | TEMPERATURE: 98.6 F | WEIGHT: 250 LBS | OXYGEN SATURATION: 97 % | RESPIRATION RATE: 16 BRPM

## 2021-08-06 DIAGNOSIS — K04.7 DENTAL INFECTION: Primary | ICD-10-CM

## 2021-08-06 DIAGNOSIS — K08.89 PAIN, DENTAL: ICD-10-CM

## 2021-08-06 DIAGNOSIS — K08.89 PAIN, DENTAL: Primary | ICD-10-CM

## 2021-08-06 PROCEDURE — 99282 EMERGENCY DEPT VISIT SF MDM: CPT

## 2021-08-06 PROCEDURE — 6370000000 HC RX 637 (ALT 250 FOR IP)

## 2021-08-06 PROCEDURE — 99213 OFFICE O/P EST LOW 20 MIN: CPT

## 2021-08-06 PROCEDURE — 99213 OFFICE O/P EST LOW 20 MIN: CPT | Performed by: EMERGENCY MEDICINE

## 2021-08-06 RX ORDER — HYDROCODONE BITARTRATE AND ACETAMINOPHEN 5; 325 MG/1; MG/1
1 TABLET ORAL EVERY 6 HOURS PRN
Qty: 10 TABLET | Refills: 0 | Status: SHIPPED | OUTPATIENT
Start: 2021-08-06 | End: 2021-08-09

## 2021-08-06 RX ORDER — OXYCODONE HYDROCHLORIDE 5 MG/1
5 TABLET ORAL ONCE
Status: COMPLETED | OUTPATIENT
Start: 2021-08-06 | End: 2021-08-06

## 2021-08-06 RX ORDER — PENICILLIN V POTASSIUM 500 MG/1
500 TABLET ORAL 4 TIMES DAILY
Qty: 40 TABLET | Refills: 0 | Status: SHIPPED | OUTPATIENT
Start: 2021-08-06 | End: 2021-08-16

## 2021-08-06 RX ORDER — LIDOCAINE HYDROCHLORIDE 20 MG/ML
5 SOLUTION OROPHARYNGEAL
Qty: 100 ML | Refills: 0 | Status: SHIPPED | OUTPATIENT
Start: 2021-08-06 | End: 2021-11-10

## 2021-08-06 RX ADMIN — OXYCODONE 5 MG: 5 TABLET ORAL at 21:38

## 2021-08-06 ASSESSMENT — ENCOUNTER SYMPTOMS
PHOTOPHOBIA: 0
CONSTIPATION: 0
SINUS PRESSURE: 0
NAUSEA: 0
SHORTNESS OF BREATH: 0
FACIAL SWELLING: 0
COUGH: 0
COLOR CHANGE: 0
SINUS PRESSURE: 0
ABDOMINAL PAIN: 0
VOMITING: 0
DIARRHEA: 0
CHEST TIGHTNESS: 0
SORE THROAT: 0
SINUS PAIN: 0
COUGH: 0
ABDOMINAL DISTENTION: 0
SHORTNESS OF BREATH: 0

## 2021-08-06 ASSESSMENT — PAIN DESCRIPTION - FREQUENCY: FREQUENCY: CONTINUOUS

## 2021-08-06 ASSESSMENT — PAIN DESCRIPTION - PROGRESSION: CLINICAL_PROGRESSION: GRADUALLY WORSENING

## 2021-08-06 ASSESSMENT — PAIN DESCRIPTION - ORIENTATION: ORIENTATION: LEFT;UPPER;LOWER

## 2021-08-06 ASSESSMENT — PAIN DESCRIPTION - PAIN TYPE
TYPE: ACUTE PAIN
TYPE: ACUTE PAIN

## 2021-08-06 ASSESSMENT — PAIN DESCRIPTION - LOCATION
LOCATION: TEETH;JAW
LOCATION: TEETH

## 2021-08-06 ASSESSMENT — PAIN SCALES - GENERAL
PAINLEVEL_OUTOF10: 4
PAINLEVEL_OUTOF10: 5
PAINLEVEL_OUTOF10: 5

## 2021-08-06 ASSESSMENT — PAIN - FUNCTIONAL ASSESSMENT: PAIN_FUNCTIONAL_ASSESSMENT: PREVENTS OR INTERFERES SOME ACTIVE ACTIVITIES AND ADLS

## 2021-08-06 ASSESSMENT — PAIN DESCRIPTION - DESCRIPTORS: DESCRIPTORS: ACHING;THROBBING;SHOOTING

## 2021-08-06 NOTE — ED TRIAGE NOTES
Patient to room with c/o left upper and lower tooth and jaw pain beginning three days ago. States dental procedure two weeks ago.

## 2021-08-06 NOTE — ED PROVIDER NOTES
times dailyHistorical Med      ibuprofen (ADVIL;MOTRIN) 600 MG tablet Take 1 tablet by mouth every 8 hours as needed for Pain, Disp-30 tablet, R-0Normal      prazosin (MINIPRESS) 5 MG capsule Take 1 capsule by mouth nightly, Disp-30 capsule, R-3Normal      acetaminophen (TYLENOL) 325 MG tablet Take 2 tablets by mouth every 4 hours as needed for Pain or Fever, Disp-120 tablet, R-0OTC             ALLERGIES     Patient is is allergic to molds & smuts and mushroom extract complex. Patients There is no immunization history for the selected administration types on file for this patient. FAMILY HISTORY     Patient's family history includes Arthritis in his maternal grandmother; Asthma in his maternal grandfather and mother; Diabetes in his maternal grandfather and mother; Heart Disease in his father and maternal grandmother; High Blood Pressure in his father; High Cholesterol in his father; Other in his father, maternal grandfather, and mother. SOCIAL HISTORY     Patient  reports that he has been smoking cigarettes. He has a 7.50 pack-year smoking history. He has quit using smokeless tobacco. He reports current alcohol use of about 2.0 standard drinks of alcohol per week. He reports current drug use. Drug: Marijuana. PHYSICAL EXAM     ED TRIAGE VITALS  BP: 127/71, Temp: 98.6 °F (37 °C), Pulse: 74, Resp: 16, SpO2: 97 %,Estimated body mass index is 35.87 kg/m² as calculated from the following:    Height as of 7/19/20: 5' 10\" (1.778 m). Weight as of this encounter: 250 lb (113.4 kg). ,No LMP for male patient. Physical Exam  Constitutional:       General: He is not in acute distress. Appearance: He is normal weight. He is not ill-appearing. HENT:      Head: Normocephalic and atraumatic. Mouth/Throat:      Pharynx: Oropharynx is clear. Cardiovascular:      Rate and Rhythm: Normal rate. Pulses: Normal pulses.    Pulmonary:      Effort: Pulmonary effort is normal.   Skin:     General: Skin is warm and dry. Capillary Refill: Capillary refill takes less than 2 seconds. Neurological:      General: No focal deficit present. Mental Status: He is alert. Psychiatric:         Mood and Affect: Mood normal.         Behavior: Behavior normal.         DIAGNOSTIC RESULTS     Labs:No results found for this visit on 08/06/21. IMAGING:    No orders to display         EKG:      URGENT CARE COURSE:     Vitals:    08/06/21 0934   BP: 127/71   Pulse: 74   Resp: 16   Temp: 98.6 °F (37 °C)   TempSrc: Temporal   SpO2: 97%   Weight: 250 lb (113.4 kg)       Medications - No data to display         PROCEDURES:  None    FINAL IMPRESSION      1. Dental infection    2. Pain, dental          DISPOSITION/ PLAN     Patient presents for dental pain. Patient likely has dental infection. Patient is scheduled for root canal of the affected tooth. Will place patient on 10-day course of Pen-Vee K and viscous lidocaine. Patient will continue Tylenol and ibuprofen. Patient had penicillin allergy listed in his chart. I discussed this with the patient and he states he has had penicillin with no issues and advises he does not have an allergy to penicillin. Patient will follow up with his dentist as soon as possible. Advised to return for worsening redness, swelling, temperature 100.5 or greater, or any new concerns.       PATIENT REFERRED TO:  Mira Lewis MD  [address] [facility]  [address] [address]      DISCHARGE MEDICATIONS:  Discharge Medication List as of 8/6/2021  9:51 AM      START taking these medications    Details   penicillin v potassium (VEETID) 500 MG tablet Take 1 tablet by mouth 4 times daily for 10 days, Disp-40 tablet, R-0Normal      lidocaine viscous hcl (XYLOCAINE) 2 % SOLN solution Take 5 mLs by mouth every 3 hours as needed for Dental Pain, Disp-100 mL, R-0Normal             Discharge Medication List as of 8/6/2021  9:51 AM          Discharge Medication List as of 8/6/2021  9:51 AM Urbano Libman, APRN - CNP    (Please note that portions of this note were completed with a voice recognition program. Efforts were made to edit the dictations but occasionally words are mis-transcribed.)          Urbano Libman, APRN - CNP  08/06/21 1002

## 2021-08-07 NOTE — ED PROVIDER NOTES
[address] [facility] [address]          Pt Name: Krishan Schulz  MRN: [id number]  [place] 1985  Date of evaluation: 8/6/2021  Treating Resident Physician: Radha Leblanc MD  Supervising Physician: Dr. Duane Fanny       Chief Complaint   Patient presents with    Dental Pain     History obtained from the patient. HISTORY OF PRESENT ILLNESS    HPI  Krishan Schulz is a 31 y.o. male who presents to the emergency department for evaluation of pain. Patient states he started having some pain in his left lower jaw about 48 hours ago. Patient was trying to break into the dentist yesterday did make it patient's dentist was closed today until Monday so he went to urgent care. Urgent care worked him up and prescribed him antibiotics and lidocaine. Patient states that the lidocaine makes his whole mouth numb except for the area of the pain. Patient states he is taken over 3 g of Tylenol and almost 3 g of Motrin since the pain started. Patient is unsure how much of the lidocaine he is used so far. She denies any chest pain shortness of breath, fever, headache, vision changes, hearing changes, numbness, tingling, lightheadedness, loss of consciousness. The patient has no other acute complaints at this time. REVIEW OF SYSTEMS   Review of Systems   Constitutional: Negative for activity change, chills, fatigue and fever. HENT: Positive for dental problem. Negative for sinus pressure and sinus pain. Eyes: Negative for photophobia and visual disturbance. Respiratory: Negative for cough, chest tightness and shortness of breath. Cardiovascular: Negative for chest pain and leg swelling. Gastrointestinal: Negative for abdominal distention, abdominal pain, constipation, diarrhea, nausea and vomiting. Genitourinary: Negative for dysuria and hematuria.    Neurological: Negative for dizziness, light-headedness, numbness and headaches. PAST MEDICAL AND SURGICAL HISTORY     Past Medical History:   Diagnosis Date    Anxiety     Bipolar disorder ([facility]     PTSD (post-traumatic stress disorder)     Substance abuse ([facility] [address]      Past Surgical History:   Procedure Laterality Date    BACK SURGERY      removed cysts from back    KNEE SURGERY           MEDICATIONS   No current facility-administered medications for this encounter. Current Outpatient Medications:     HYDROcodone-acetaminophen (NORCO) 5-325 MG per tablet, Take 1 tablet by mouth every 6 hours as needed for Pain for up to 3 days. Intended supply: 3 days.  Take lowest dose possible to manage pain, Disp: 10 tablet, Rfl: 0    penicillin v potassium (VEETID) 500 MG tablet, Take 1 tablet by mouth 4 times daily for 10 days, Disp: 40 tablet, Rfl: 0    lidocaine viscous hcl (XYLOCAINE) 2 % SOLN solution, Take 5 mLs by mouth every 3 hours as needed for Dental Pain, Disp: 100 mL, Rfl: 0    lamoTRIgine (LAMICTAL) 200 MG tablet, Take 200 mg by mouth daily, Disp: , Rfl:     OLANZapine (ZYPREXA) 5 MG tablet, Take 5 mg by mouth 2 times daily prn, Disp: , Rfl:     busPIRone (BUSPAR) 5 MG tablet, Take 5 mg by mouth 2 times daily, Disp: , Rfl:     ibuprofen (ADVIL;MOTRIN) 600 MG tablet, Take 1 tablet by mouth every 8 hours as needed for Pain, Disp: 30 tablet, Rfl: 0    prazosin (MINIPRESS) 5 MG capsule, Take 1 capsule by mouth nightly (Patient taking differently: Take 10 mg by mouth nightly ), Disp: 30 capsule, Rfl: 3    acetaminophen (TYLENOL) 325 MG tablet, Take 2 tablets by mouth every 4 hours as needed for Pain or Fever, Disp: 120 tablet, Rfl: 0      SOCIAL HISTORY     Social History     Social History Narrative    Not on file     Social History     Tobacco Use    Smoking status: Current Every Day Smoker     Packs/day: 0.75     Years: 10.00     Pack years: 7.50     Types: Cigarettes    Smokeless tobacco: Former User   Vaping Use    Vaping Use: Never used   Substance Use Topics    Alcohol use: Yes     Alcohol/week: 2.0 standard drinks     Types: 2 Shots of liquor per week     Comment: rarely    Drug use: Yes     Types: Marijuana     Comment: 2020         ALLERGIES     Allergies   Allergen Reactions    Molds & Smuts     Mushroom Extract Complex Swelling         FAMILY HISTORY     Family History   Problem Relation Age of Onset    Asthma Mother     Diabetes Mother    Kathie Solum Other Mother     High Blood Pressure Father     High Cholesterol Father     Other Father     Heart Disease Father     Arthritis Maternal Grandmother     Heart Disease Maternal Grandmother     Diabetes Maternal Grandfather     Asthma Maternal Grandfather     Other Maternal Grandfather          PREVIOUS RECORDS   Previous records reviewed: Previously seen earlier today for similar complaint. Was given antibiotics and oral lidocaine for pain relief and instructed to follow-up with a dentist.        [address]     ED Triage Vitals [08/06/21 2044]   BP Temp Temp Source Pulse Resp SpO2 Height Weight   (!) 168/104 97.9 °F (36.6 °C) Oral 74 17 96 % -- --     Initial vital signs and nursing assessment reviewed and abnormal from Hypertension. There is no height or weight on file to calculate BMI. Pulsoximetry is normal per my interpretation. Additional Vital Signs:  Vitals:    08/06/21 2044   BP: (!) 168/104   Pulse: 74   Resp: 17   Temp: 97.9 °F (36.6 °C)   SpO2: 96%       Physical Exam  Constitutional:       Appearance: He is normal weight. He is not ill-appearing. HENT:      Head: Normocephalic and atraumatic. Mouth/Throat:      Mouth: Mucous membranes are moist. No injury, lacerations, oral lesions or angioedema. Dentition: Dental tenderness present. No dental caries or dental abscesses. Tongue: No lesions. Tongue does not deviate from midline. Palate: No mass and lesions. Pharynx: Oropharynx is clear.  No pharyngeal swelling, oropharyngeal exudate, posterior oropharyngeal erythema or uvula swelling. Comments: Patient has no erythema or bulging of the milliliters infection or abscess in the area. Patient has no tenderness to palpation. Eyes:      General: No scleral icterus. Right eye: No discharge. Left eye: No discharge. Cardiovascular:      Rate and Rhythm: Normal rate and regular rhythm. Heart sounds: Normal heart sounds. Pulmonary:      Effort: Pulmonary effort is normal.      Breath sounds: Normal breath sounds. Abdominal:      General: Abdomen is flat. Bowel sounds are normal.      Palpations: Abdomen is soft. Musculoskeletal:         General: No swelling or deformity. Normal range of motion. Cervical back: Normal range of motion and neck supple. No rigidity or tenderness. Skin:     General: Skin is warm and dry. Coloration: Skin is not jaundiced. Neurological:      General: No focal deficit present. Mental Status: He is alert and oriented to person, place, and time. Psychiatric:         Mood and Affect: Mood normal.         Behavior: Behavior normal.             MEDICAL DECISION MAKING   Initial Assessment:   1. Dental pain secondary to delayed root canal.  Plan:    Oxycodone in patient   Norco for discharge. ED RESULTS   Laboratory results:  Labs Reviewed - No data to display    Radiologic studies results:  No orders to display       ED Medications administered this visit:   Medications   oxyCODONE (ROXICODONE) immediate release tablet 5 mg (5 mg Oral Given 8/6/21 2138)         ED COURSE      Patient given dose of oxycodone in the ED for pain control be discharged on Jessika Gails till he can make it to his dentist on Monday for follow-up. Strict return precautions and follow up instructions were discussed with the patient prior to discharge, with which the patient agrees.       MEDICATION CHANGES     New Prescriptions    HYDROCODONE-ACETAMINOPHEN (NORCO) 5-325 MG PER TABLET    Take 1 tablet by mouth every 6 hours as needed for Pain for up to 3 days. Intended supply: 3 days. Take lowest dose possible to manage pain         FINAL DISPOSITION     Final diagnoses:   Pain, dental     Condition: condition: stable  Dispo: Discharge to home      This transcription was electronically signed. Parts of this transcriptions may have been dictated by use of voice recognition software and electronically transcribed, and parts may have been transcribed with the assistance of an ED scribe. The transcription may contain errors not detected in proofreading. Please refer to my supervising physician's documentation if my documentation differs.     Electronically Signed: [place] MD, 08/06/21, 9:54 PM          Giselle Kirk MD  Resident  08/06/21 0473       Giselle Kirk MD  Resident  08/06/21 0693

## 2021-08-10 ENCOUNTER — HOSPITAL ENCOUNTER (OUTPATIENT)
Dept: OCCUPATIONAL THERAPY | Age: 36
Setting detail: THERAPIES SERIES
Discharge: HOME | End: 2021-08-10
Payer: COMMERCIAL

## 2021-08-10 PROCEDURE — 97035 APP MDLTY 1+ULTRASOUND EA 15: CPT

## 2021-08-10 PROCEDURE — 97140 MANUAL THERAPY 1/> REGIONS: CPT

## 2021-08-10 PROCEDURE — 97110 THERAPEUTIC EXERCISES: CPT

## 2021-08-10 NOTE — PROGRESS NOTES
[address] THERAPY  [] EVALUATION  [x] DAILY NOTE (LAND) [] DAILY NOTE (AQUATIC ) [] PROGRESS NOTE [] DISCHARGE NOTE    [x] OUTPATIENT REHABILITATION [facility]   [] [place] [address]    [] [facility]   [] Tyler Arguello    Date: 8/10/2021  Patient Name:  Tony Nunez  : 1985  MRN: [id number]  CSN: [id number]    Referring Practitioner Nathalie Rosado, APR*   Diagnosis Unspecified sprain of right elbow, initial encounter [S53.401A]  Strain of unspecified muscles, fascia and tendons at forearm level, right arm, initial encounter [S56.911A]    Treatment Diagnosis Unspecified sprain of right elbow, initial encounter [S53.401A]  Strain of unspecified muscles, fascia and tendons at forearm level, right arm, initial encounter [M46.367V]   Date of Evaluation 21      Functional Outcome Measure Used Upper Extremity Functional Scale   Functional Outcome Score 53/80 (21)       Insurance: Primary: Payor: OUMAR SLOAN /  /  / ,   Secondary:    Authorization Information: PRE CERTIFICATION REQUIRED: YES, APPROVED FOR PT/OT TOTAL OF 12 VISITS, 21 TO 21  INSURANCE THERAPY BENEFIT:     AQUATIC THERAPY COVERED: NO  MODALITIES COVERED:  YES, Dong 10 APPROVED    Visit # 7, 7/10 for progress note   Visits Allowed: 12   Recertification Date:    Physician Follow-Up: 7/15/21   Physician Orders: Justice Ravi and treat   Pertinent History: 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 He reports he felt a pop in his elbow  Patient had MRI at THE [facility] on [facility] [address] to get results. Other PMH includes bipolar and anxiety disorder. SUBJECTIVE: Patient reports he feels like his elbow is getting better. More of a dull ache now. Goes by Devoria Salines.     TREATMENT   Precautions: General precautions    Pain:at rest 1/10     X in shaded column indicates Activity Completed Today   Modalities Parameters/  Location  Notes/Comments   Iontophoresis with dexamethasone 0.4%, 40 dose, 2.5 mL, 3.5 mA x 12 minutes  X                Manual Therapy Time/  Technique  Notes/Comments   IASTM/Astym to lateral elbow, forearm extensors, tricep 10-15 min X                 Exercises   Sets/  Sec Reps  Notes/Comments   AROM elbow flexion and extension 1 10 x    AROM supination/pronation elbow at side and elbow extended 1 10 ea X    AROM wrist flexion and extension elbow extended this date 1 10 ea X Both in pronated and neutral position   AAROM Supinator wheel for gentle elbow extension; wrist flex/ext 1 10  Gentle     Tricep stretch 15 sec 3 X    Gentle stretching wrist flexion with elbow bent at 90, forearm neutral and pronated 30 sec 3 each position X           Activities Time    Notes/Comments                       Specific Interventions Next Treatment: iontophoresis, IASTM, pain-free ROM    Activity/Treatment Tolerance:  [x]  Patient tolerated treatment well  []  Patient limited by fatigue  []  Patient limited by pain   []  Patient limited by other medical complications  []  Other:     Assessment: Patient is slowly progressing towards his goals. Moved into Phase II exercises for HEP this date. Areas for Improvement: impaired activity tolerance, impaired ROM, impaired strength and pain  Prognosis: good    GOALS:  Patient Goal: reduce pain    Short Term Goals:  Time Frame: 4 weeks  *  Patient will demonstrate AROM wrist flexion in elbow extended position to 35 degrees to improve soft tissue length for better flexibility for self care. * Patient will report pain with AROM of R arm to no more than 2/10 for improved tolerance for lifting. * Patient will demonstrate I knowledge of HEP for improved flexibility and eventual strength for lifting. Long Term Goals:  Time Frame: 12 weeks  *  Patient will improve UEFS to at least 62. *  Patient will report no more than 2/10 pain with pushing up from a chair.    * Patient will demonstrate at least 30# of  R hand in loaded position for improved strength for RTW tasks. Patient Education:   [x]  HEP/Education Completed: Plan of Care, Goals, HEP (see below)    AROM elbow, forearm and wrist in pain-free ROM, tricep stretch and self massage added 7/27   Phase II active stretching (wrist flexion with elbow at side in neutral and pronation, wrist flexion with elbow extended forearm in neutral and pronation) added 8/10  []  No new Education completed  []  Reviewed Prior HEP      [x]  Patient verbalized and/or demonstrated understanding of education provided. []  Patient unable to verbalize and/or demonstrate understanding of education provided. Will continue education. [x]  Barriers to learning: none    PLAN:  Treatment Recommendations: Range of Motion, Manual Therapy - Soft Tissue Mobilization, Manual Therapy - Joint Manipulation, Pain Management, Home Exercise Program, Patient Education, Integrative Dry Needling and Modalities    []  Plan of care initiated. Plan to see patient 2 times per week for 12 weeks to address the treatment planned outlined above.   [x]  Continue with current plan of care  []  Modify plan of care as follows:    []  Hold pending physician visit  []  Discharge    CPT CODE TIME-IN TIME-OUT TOTAL TIME   25751 (Manual) 30-41-24-27 (Th ex) [phone]   82782 (Ionto)  [phone] 15         TOTAL SESSION 2934 1155 38         Electronically Signed by:  Efe CALLAWAY/DIAMOND, CHT #[id number]

## 2021-08-12 ENCOUNTER — HOSPITAL ENCOUNTER (OUTPATIENT)
Dept: OCCUPATIONAL THERAPY | Age: 36
Setting detail: THERAPIES SERIES
Discharge: HOME | End: 2021-08-12
Payer: COMMERCIAL

## 2021-08-12 PROCEDURE — 97033 APP MDLTY 1+IONTPHRSIS EA 15: CPT

## 2021-08-12 PROCEDURE — 97110 THERAPEUTIC EXERCISES: CPT

## 2021-08-12 PROCEDURE — 97140 MANUAL THERAPY 1/> REGIONS: CPT

## 2021-08-12 NOTE — PROGRESS NOTES
[address] THERAPY  [] EVALUATION  [x] DAILY NOTE (LAND) [] DAILY NOTE (AQUATIC ) [] PROGRESS NOTE [] DISCHARGE NOTE    [x] OUTPATIENT REHABILITATION [facility]   [] [place] [address]    [] [facility]   [] Hillary Arroyo    Date: 2021  Patient Name:  Keesha Bar  : 1985  MRN: [id number]  CSN: [id number]    Referring Practitioner Lila Alvarenga, APR*   Diagnosis Unspecified sprain of right elbow, initial encounter [S53.401A]  Strain of unspecified muscles, fascia and tendons at forearm level, right arm, initial encounter [S56.911A]    Treatment Diagnosis Unspecified sprain of right elbow, initial encounter [B45.878S]  Strain of unspecified muscles, fascia and tendons at forearm level, right arm, initial encounter [S06.057X]   Date of Evaluation 21      Functional Outcome Measure Used Upper Extremity Functional Scale   Functional Outcome Score 53/80 (21)       Insurance: Primary: Payor: OUMAR SLOAN /  /  / ,   Secondary:    Authorization Information: PRE CERTIFICATION REQUIRED: YES, APPROVED FOR PT/OT TOTAL OF 12 VISITS, 21 TO 21  INSURANCE THERAPY BENEFIT:     AQUATIC THERAPY COVERED: NO  MODALITIES COVERED:  YES, Dong 10 APPROVED    Visit # 8, 8/10 for progress note   Visits Allowed: 12   Recertification Date:    Physician Follow-Up: 7/15/21   Physician Orders: Adenike Andrade and treat   Pertinent History: 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 He reports he felt a pop in his elbow  Patient had MRI at [facility] on [facility] [address] to get results. Other PMH includes bipolar and anxiety disorder. SUBJECTIVE:  Patient reporting no pain at rest, maybe some soreness. Still taking Tylenol and ibuprofen. Goes by Don Dias.     TREATMENT   Precautions: General precautions    Pain: No pain at rest.     X in shaded column indicates Activity Completed Today Modalities Parameters/  Location  Notes/Comments   Iontophoresis with dexamethasone 0.4%, 40 dose, 2.5 mL, 3.5 mA x 12 minutes  X                Manual Therapy Time/  Technique  Notes/Comments   IASTM/Astym to lateral elbow, forearm extensors, tricep 10-15 min X                 Exercises   Sets/  Sec Reps  Notes/Comments   Phase II active stretching  15 se 5 each X Able to complete all 4 positions with good tolerance   2# elbow curl 1 10 X    Sup/pro 2# elbow at sit 1 10 X                                                     Activities Time    Notes/Comments                       Specific Interventions Next Treatment: iontophoresis, IASTM, pain-free ROM    Activity/Treatment Tolerance:  [x]  Patient tolerated treatment well  []  Patient limited by fatigue  []  Patient limited by pain   []  Patient limited by other medical complications  []  Other:     Assessment: Patient is progressing toward goals. Initiated strengthening this date, patient states \"I can feel it\" but denied pain with exercise. Plan to advance slowly in clinic as long as patient's symptoms don't worsen. Areas for Improvement: impaired activity tolerance, impaired ROM, impaired strength and pain  Prognosis: good    GOALS:  Patient Goal: reduce pain    Short Term Goals:  Time Frame: 4 weeks  *  Patient will demonstrate AROM wrist flexion in elbow extended position to 35 degrees to improve soft tissue length for better flexibility for self care. * Patient will report pain with AROM of R arm to no more than 2/10 for improved tolerance for lifting. * Patient will demonstrate I knowledge of HEP for improved flexibility and eventual strength for lifting. Long Term Goals:  Time Frame: 12 weeks  *  Patient will improve UEFS to at least 62. *  Patient will report no more than 2/10 pain with pushing up from a chair. * Patient will demonstrate at least 30# of  R hand in loaded position for improved strength for RTW tasks.     Patient Education:   []  HEP/Education Completed: Plan of Care, Goals, HEP (see below)    AROM elbow, forearm and wrist in pain-free ROM, tricep stretch and self massage added 7/27   Phase II active stretching (wrist flexion with elbow at side in neutral and pronation, wrist flexion with elbow extended forearm in neutral and pronation) added 8/10  [x]  No new Education completed  []  Reviewed Prior HEP      [x]  Patient verbalized and/or demonstrated understanding of education provided. []  Patient unable to verbalize and/or demonstrate understanding of education provided. Will continue education. [x]  Barriers to learning: none    PLAN:  Treatment Recommendations: Range of Motion, Manual Therapy - Soft Tissue Mobilization, Manual Therapy - Joint Manipulation, Pain Management, Home Exercise Program, Patient Education, Integrative Dry Needling and Modalities    []  Plan of care initiated. Plan to see patient 2 times per week for 12 weeks to address the treatment planned outlined above.   [x]  Continue with current plan of care  []  Modify plan of care as follows:    []  Hold pending physician visit  []  Discharge    CPT CODE TIME-IN TIME-OUT TOTAL TIME   76846 (Manual) 30-41-24-27 (Th ex) 8530 7819 17   26248 (Ionto)  8702 7565 15         TOTAL SESSION 3665 5584 97         Electronically Signed by:  Candace CALLAWAY/L, T #[id number]

## 2021-08-17 ENCOUNTER — HOSPITAL ENCOUNTER (OUTPATIENT)
Dept: OCCUPATIONAL THERAPY | Age: 36
Setting detail: THERAPIES SERIES
Discharge: HOME | End: 2021-08-17
Payer: COMMERCIAL

## 2021-08-17 PROCEDURE — 97110 THERAPEUTIC EXERCISES: CPT

## 2021-08-17 PROCEDURE — 97033 APP MDLTY 1+IONTPHRSIS EA 15: CPT

## 2021-08-17 PROCEDURE — 97140 MANUAL THERAPY 1/> REGIONS: CPT

## 2021-08-17 NOTE — PROGRESS NOTES
[address] THERAPY  [] EVALUATION  [x] DAILY NOTE (LAND) [] DAILY NOTE (AQUATIC ) [] PROGRESS NOTE [] DISCHARGE NOTE    [x] OUTPATIENT REHABILITATION [facility]   [] [place] [address]    [] [facility]   [] May Lopez    Date: 2021  Patient Name:  Abigail Sesay  : 1985  MRN: [id number]  CSN: [id number]    Referring Practitioner Karlos Segura APR*   Diagnosis Unspecified sprain of right elbow, initial encounter [S53.401A]  Strain of unspecified muscles, fascia and tendons at forearm level, right arm, initial encounter [S56.911A]    Treatment Diagnosis Unspecified sprain of right elbow, initial encounter [S53.401A]  Strain of unspecified muscles, fascia and tendons at forearm level, right arm, initial encounter [H65.191G]   Date of Evaluation 21      Functional Outcome Measure Used Upper Extremity Functional Scale   Functional Outcome Score 53/80 (21)       Insurance: Primary: Payor: OUMAR LEATHA /  /  / ,   Secondary:    Authorization Information: PRE CERTIFICATION REQUIRED: YES, APPROVED FOR PT/OT TOTAL OF 12 VISITS, 21 TO 21  INSURANCE THERAPY BENEFIT:     AQUATIC THERAPY COVERED: NO  MODALITIES COVERED:  YES, Ghislaine Sifuentes 308    Visit # 9, 9/10 for progress note   Visits Allowed: 12   Recertification Date:    Physician Follow-Up: 7/15/21   Physician Orders: Sana Potts and treat   Pertinent History: 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 He reports he felt a pop in his elbow  Patient had MRI at [facility] on [facility] [address] to get results. Other PMH includes bipolar and anxiety disorder. SUBJECTIVE:  Patient reporting \"dull soreness\" throughout the day and during completion with HEP. Patient reports it is not \"painful\" to tell and rate. Patient reports (-) sleep disturbances, with improvements noted. Reports compliance with HEP 2-3x/day. Goals: Time Frame: 12 weeks  *  Patient will improve UEFS to at least 62. *  Patient will report no more than 2/10 pain with pushing up from a chair. * Patient will demonstrate at least 30# of  R hand in loaded position for improved strength for RTW tasks. Patient Education:   []  HEP/Education Completed: Plan of Care, Goals, HEP (see below)    AROM elbow, forearm and wrist in pain-free ROM, tricep stretch and self massage added 7/27   Phase II active stretching (wrist flexion with elbow at side in neutral and pronation, wrist flexion with elbow extended forearm in neutral and pronation) added 8/10  []  No new Education completed  [x]  Reviewed Prior HEP      [x]  Patient verbalized and/or demonstrated understanding of education provided. []  Patient unable to verbalize and/or demonstrate understanding of education provided. Will continue education. [x]  Barriers to learning: none    PLAN:  Treatment Recommendations: Range of Motion, Manual Therapy - Soft Tissue Mobilization, Manual Therapy - Joint Manipulation, Pain Management, Home Exercise Program, Patient Education, Integrative Dry Needling and Modalities    []  Plan of care initiated. Plan to see patient 2 times per week for 12 weeks to address the treatment planned outlined above. [x]  Continue with current plan of care  []  Modify plan of care as follows:    []  Hold pending physician visit  []  Discharge    CPT CODE TIME-IN TIME-OUT TOTAL TIME   26285 (Manual) [phone]   23488 (Th ex) [phone] 06   90968 (Ionto)  1143 1200 15         TOTAL SESSION 1115 1200 45         Electronically Signed by:   Jaren CLOUD #[id number]

## 2021-08-19 ENCOUNTER — APPOINTMENT (OUTPATIENT)
Dept: OCCUPATIONAL THERAPY | Age: 36
End: 2021-08-19
Payer: COMMERCIAL

## 2021-08-20 ENCOUNTER — HOSPITAL ENCOUNTER (OUTPATIENT)
Dept: OCCUPATIONAL THERAPY | Age: 36
Setting detail: THERAPIES SERIES
Discharge: HOME | End: 2021-08-20
Payer: COMMERCIAL

## 2021-08-20 PROCEDURE — 97140 MANUAL THERAPY 1/> REGIONS: CPT

## 2021-08-20 PROCEDURE — 97110 THERAPEUTIC EXERCISES: CPT

## 2021-08-20 PROCEDURE — 97033 APP MDLTY 1+IONTPHRSIS EA 15: CPT

## 2021-08-20 NOTE — PROGRESS NOTES
[address] THERAPY  [] EVALUATION  [x] DAILY NOTE (LAND) [] DAILY NOTE (AQUATIC ) [] PROGRESS NOTE [] DISCHARGE NOTE    [x] OUTPATIENT REHABILITATION [facility]   [] [place] [address]    [] [facility]   [] Anjana Shaffer    Date: 2021  Patient Name:  Jane De  : 1985  MRN: [id number]  CSN: [id number]    Referring Practitioner Ketan Granger, APR*   Diagnosis Unspecified sprain of right elbow, initial encounter [S53.401A]  Strain of unspecified muscles, fascia and tendons at forearm level, right arm, initial encounter [S56.911A]    Treatment Diagnosis Unspecified sprain of right elbow, initial encounter [S53.401A]  Strain of unspecified muscles, fascia and tendons at forearm level, right arm, initial encounter [D26.421X]   Date of Evaluation 21      Functional Outcome Measure Used Upper Extremity Functional Scale   Functional Outcome Score 53/80 (21)  73/80 (21)      Insurance: Primary: Payor: OUMAR LEATHA /  /  / ,   Secondary:    Authorization Information: PRE CERTIFICATION REQUIRED: YES, APPROVED FOR PT/OT TOTAL OF 12 VISITS, 21 TO 21  INSURANCE THERAPY BENEFIT:     AQUATIC THERAPY COVERED: NO  MODALITIES COVERED:  YES, JENA HAS BEEN APPROVED    Visit # 10, PN completed 21   Visits Allowed: 12   Recertification Date: 47   Physician Follow-Up: 21, also seeing Dr. Mattie calderon. 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 He reports he felt a pop in his elbow  Patient had MRI at [facility] on [facility] [address] to get results. Other PMH includes bipolar and anxiety disorder. SUBJECTIVE:  Patient reporting \"dull soreness\" throughout the day and during completion with HEP. Patient reports it is not \"painful\" to tell and rate. Patient reports (-) sleep disturbances, with improvements noted. Reports compliance with HEP 2-3x/day. Goes by Pierson. TREATMENT   Precautions: General precautions    Pain: No pain at rest.     X in shaded column indicates Activity Completed Today   Modalities Parameters/  Location  Notes/Comments   Iontophoresis with dexamethasone 0.4%, 40 dose, 2.5 mL, 3.5 mA x 12 minutes  X                Manual Therapy Time/  Technique  Notes/Comments   IASTM/Astym to lateral elbow, forearm extensors, tricep 10-15 min X                 Exercises   Sets/  Sec Reps  Notes/Comments   Phase III passive stretching  15 se 3 each X Able to complete all 4 positions with good tolerance \"I can feel it, not bad\"   2# elbow curl 1 10 X    Sup/pro 2# elbow at side 1 10 X    Seated Tricep Stretch  10 sec 3     Red Flex Bar 1 10 ea X Flex/ext, sup/pro amrit well                                      Activities Time    Notes/Comments                       Specific Interventions Next Treatment: iontophoresis, IASTM, pain-free ROM    Activity/Treatment Tolerance:  [x]  Patient tolerated treatment well  []  Patient limited by fatigue  []  Patient limited by pain   []  Patient limited by other medical complications  []  Other:     Assessment: Patient demonstrating good progress toward goals. Improvement in strength, ROM and pain report is improved. See goals for current objective measures. Patient reports little to no difficulty with daily tasks, with most difficulty reported with lifting and turning doorknobs with arm extended (supinating with resistance). Areas for Improvement: impaired activity tolerance, impaired ROM, impaired strength and pain  Prognosis: good    GOALS:  Patient Goal: reduce pain    Short Term Goals:  Time Frame: 2 weeks  *  Patient will demonstrate AROM wrist flexion in elbow extended position to 35 degrees to improve soft tissue length for better flexibility for self care. AROM 60 this date.   GOAL MET - REVISED GOAL:  Patient will improve R elbow/forearm strength to 4+/5 to be able to push self up from chair. * Patient will report pain with AROM of R arm to no more than 2/10 for improved tolerance for lifting. GOAL MET - REVISED GOAL:  Patient will report ability to lift a bag of groceries with no more than minimal difficulty. * Patient will demonstrate I knowledge of HEP for improved flexibility and eventual strength for lifting. GOAL MET - CONTINUE GOAL    Long Term Goals:  Time Frame: 2 weeks  *  Patient will improve UEFS to at least 62.  73/80. GOAL MET - REVISED GOAL:  Patient will improve UEFS to greater than 73. *  Patient will report no more than 2/10 pain with pushing up from a chair. GOAL NOT MET - CONTINUE  * Patient will demonstrate at least 30# of  R hand in loaded position for improved strength for RTW tasks.  48#. GOAL MET - REVISED GOAL:  Patient will demonstrate at least 55# of loaded R  for improved strength for RTW tasks. Patient Education:   [x]  HEP/Education Completed: Plan of Care, Goals, HEP (see below)    AROM elbow, forearm and wrist in pain-free ROM, tricep stretch and self massage added 7/27   Phase II active stretching (wrist flexion with elbow at side in neutral and pronation, wrist flexion with elbow extended forearm in neutral and pronation)   Phase III passive stretching added 8/20  []  No new Education completed  []  Reviewed Prior HEP      [x]  Patient verbalized and/or demonstrated understanding of education provided. []  Patient unable to verbalize and/or demonstrate understanding of education provided. Will continue education. [x]  Barriers to learning: none    PLAN:  Treatment Recommendations: Range of Motion, Manual Therapy - Soft Tissue Mobilization, Manual Therapy - Joint Manipulation, Pain Management, Home Exercise Program, Patient Education, Integrative Dry Needling and Modalities    []  Plan of care initiated.    [x]  Continue with current plan of care   Plan to see patient 1 times per week for 2 weeks to address the treatment planned outlined above.   []  Modify plan of care as follows:    []  Hold pending physician visit  []  Discharge    CPT CODE TIME-IN TIME-OUT TOTAL TIME   67906 (Manual) [phone]   50339 (Th ex) 1371 7577 91   56518 (Ionto)  0561 5023 15         TOTAL SESSION 1130 1210 40         Electronically Signed by:  Jose CALLAWAY/DIAMOND, CHT #[id number]

## 2021-08-24 ENCOUNTER — HOSPITAL ENCOUNTER (OUTPATIENT)
Dept: OCCUPATIONAL THERAPY | Age: 36
Setting detail: THERAPIES SERIES
Discharge: HOME | End: 2021-08-24
Payer: COMMERCIAL

## 2021-08-24 ENCOUNTER — APPOINTMENT (OUTPATIENT)
Dept: OCCUPATIONAL THERAPY | Age: 36
End: 2021-08-24
Payer: COMMERCIAL

## 2021-08-24 PROCEDURE — 97110 THERAPEUTIC EXERCISES: CPT

## 2021-08-24 PROCEDURE — 97140 MANUAL THERAPY 1/> REGIONS: CPT

## 2021-08-24 PROCEDURE — 97033 APP MDLTY 1+IONTPHRSIS EA 15: CPT

## 2021-08-24 NOTE — PROGRESS NOTES
[address] THERAPY  [] EVALUATION  [x] DAILY NOTE (LAND) [] DAILY NOTE (AQUATIC ) [] PROGRESS NOTE [] DISCHARGE NOTE    [x] OUTPATIENT REHABILITATION [facility]   [] [place] [address]    [] [facility]   [] [place]    Date: 2021  Patient Name:  Lexie Muhammad  : 1985  MRN: [id number]  CSN: [id number]    Referring Practitioner Meño Collins, APR*   Diagnosis Unspecified sprain of right elbow, initial encounter [S53.401A]  Strain of unspecified muscles, fascia and tendons at forearm level, right arm, initial encounter [S56.911A]    Treatment Diagnosis Unspecified sprain of right elbow, initial encounter [S53.401A]  Strain of unspecified muscles, fascia and tendons at forearm level, right arm, initial encounter [F88.422F]   Date of Evaluation 21      Functional Outcome Measure Used Upper Extremity Functional Scale   Functional Outcome Score 53/80 (21)  73/80 (21)      Insurance: Primary: Payor: [facility] /  /  / ,   Secondary:    Authorization Information: PRE CERTIFICATION REQUIRED: YES, APPROVED FOR PT/OT TOTAL OF 12 VISITS, 21 TO 21  INSURANCE THERAPY BENEFIT:     AQUATIC THERAPY COVERED: NO  MODALITIES COVERED:  YES, JENA HAS BEEN APPROVED    Visit # 6, 1/10 ;PN completed 21   Visits Allowed: 12   Recertification Date:    Physician Follow-Up: 21, also seeing Dr. Allie calderon. 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 He reports he felt a pop in his elbow  Patient had MRI at [facility] on [address] to get results. Other PMH includes bipolar and anxiety disorder. SUBJECTIVE:  Patient reports he saw MD yesterday, with written off for 6 more weeks. Patient reports MD advised him to wear Bandit everyday.  Patient reports his strength was tested, with increased pain and discomfort noted. Patient believes he has edema present from MD appt. Goes by Marisol Quiñonez. TREATMENT   Precautions: General precautions    Pain: 1-2/10. X in shaded column indicates Activity Completed Today   Modalities Parameters/  Location  Notes/Comments   Iontophoresis with dexamethasone 0.4%, 40 dose, 2.5 mL, 3.5 mA x 12 minutes  X Increased to 4.0 mA at half way point with pt.requesting more               Manual Therapy Time/  Technique  Notes/Comments   IASTM/Astym to lateral elbow, forearm extensors, tricep 10-15 min X  focused heavily this date, with high tone present proximal FA                Exercises   Sets/  Sec Reps  Notes/Comments   Phase III passive stretching  15 se 3 each X Able to complete all 4 positions with good tolerance \"I can feel it more than the past\". 2# elbow curl 1 10 X No weight this date   Sup/pro 2# elbow at side 1 10 X No weight this date    Seated Tricep Stretch  10 sec 3     Red Flex Bar 1 10 ea  Flex/ext, sup/pro amrit well                                      Activities Time    Notes/Comments                       Specific Interventions Next Treatment: iontophoresis, IASTM, pain-free ROM    Activity/Treatment Tolerance:  [x]  Patient tolerated treatment well  []  Patient limited by fatigue  []  Patient limited by pain   []  Patient limited by other medical complications  []  Other:     Assessment: Patient demonstrating fair progress toward goals. Education on use and wear of Bandit, to remove while completing HEP exercises and at rest at home, with good understanding. Focused on IASTM and active stretches this date, to decrease high tone present. Improvement in pain noted at end of session.        Areas for Improvement: impaired activity tolerance, impaired ROM, impaired strength and pain  Prognosis: good    GOALS:  Patient Goal: reduce pain    Short Term Goals:  Time Frame: 2 weeks  *  Patient will demonstrate AROM wrist flexion in elbow extended position to 35 degrees to improve soft tissue length for better flexibility for self care. AROM 60 this date. GOAL MET - REVISED GOAL:  Patient will improve R elbow/forearm strength to 4+/5 to be able to push self up from chair. * Patient will report pain with AROM of R arm to no more than 2/10 for improved tolerance for lifting. GOAL MET - REVISED GOAL:  Patient will report ability to lift a bag of groceries with no more than minimal difficulty. * Patient will demonstrate I knowledge of HEP for improved flexibility and eventual strength for lifting. GOAL MET - CONTINUE GOAL    Long Term Goals:  Time Frame: 2 weeks  *  Patient will improve UEFS to at least 62.  73/80. GOAL MET - REVISED GOAL:  Patient will improve UEFS to greater than 73. *  Patient will report no more than 2/10 pain with pushing up from a chair. GOAL NOT MET - CONTINUE  * Patient will demonstrate at least 30# of  R hand in loaded position for improved strength for RTW tasks.  48#. GOAL MET - REVISED GOAL:  Patient will demonstrate at least 55# of loaded R  for improved strength for RTW tasks. Patient Education:   [x]  HEP/Education Completed: Plan of Care, Goals, HEP (see below)    AROM elbow, forearm and wrist in pain-free ROM, tricep stretch and self massage added 7/27   Phase II active stretching (wrist flexion with elbow at side in neutral and pronation, wrist flexion with elbow extended forearm in neutral and pronation)   Phase III passive stretching added 8/20  []  No new Education completed  []  Reviewed Prior HEP      [x]  Patient verbalized and/or demonstrated understanding of education provided. []  Patient unable to verbalize and/or demonstrate understanding of education provided. Will continue education.   [x]  Barriers to learning: none    PLAN:  Treatment Recommendations: Range of Motion, Manual Therapy - Soft Tissue Mobilization, Manual Therapy - Joint Manipulation, Pain Management, Home Exercise Program, Patient Education, Integrative Dry Needling and Modalities    []  Plan of care initiated. [x]  Continue with current plan of care   Plan to see patient 1 times per week for 2 weeks to address the treatment planned outlined above. []  Modify plan of care as follows:    []  Hold pending physician visit  []  Discharge    CPT CODE TIME-IN TIME-OUT TOTAL TIME   86610 (Manual) [id number]   96716 (Th ex) [phone]   30277 (Ionto)  [phone]         TOTAL SESSION [id number]         Electronically Signed by:   Sheila SCHERER/L #[id number]

## 2021-08-26 ENCOUNTER — APPOINTMENT (OUTPATIENT)
Dept: OCCUPATIONAL THERAPY | Age: 36
End: 2021-08-26
Payer: COMMERCIAL

## 2021-08-30 ENCOUNTER — HOSPITAL ENCOUNTER (OUTPATIENT)
Dept: OCCUPATIONAL THERAPY | Age: 36
Setting detail: THERAPIES SERIES
Discharge: HOME | End: 2021-08-30
Payer: COMMERCIAL

## 2021-08-30 PROCEDURE — 97110 THERAPEUTIC EXERCISES: CPT

## 2021-08-30 PROCEDURE — 97033 APP MDLTY 1+IONTPHRSIS EA 15: CPT

## 2021-08-30 PROCEDURE — 97140 MANUAL THERAPY 1/> REGIONS: CPT

## 2021-08-30 NOTE — DISCHARGE SUMMARY
[address]  [] EVALUATION  [] DAILY NOTE (LAND) [] DAILY NOTE (AQUATIC ) [] PROGRESS NOTE [x] DISCHARGE NOTE    [x] OUTPATIENT REHABILITATION [facility]   [] [place] [address]    [] [facility]   [] [place]    Date: 2021  Patient Name:  Kellen Smith  : 1985  MRN: [id number]  CSN: [id number]    Referring Practitioner Mca Cesar, APR*   Diagnosis Unspecified sprain of right elbow, initial encounter [S53.401A]  Strain of unspecified muscles, fascia and tendons at forearm level, right arm, initial encounter [S56.911A]    Treatment Diagnosis Unspecified sprain of right elbow, initial encounter [S53.401A]  Strain of unspecified muscles, fascia and tendons at forearm level, right arm, initial encounter [K75.701P]   Date of Evaluation 21      Functional Outcome Measure Used Upper Extremity Functional Scale   Functional Outcome Score 53/80 (21)  73/80 (21)     70/80 (21)      Insurance: Primary: Payor: [employer] [facility] /  /  / ,   Secondary:    Authorization Information: PRE CERTIFICATION REQUIRED: YES, APPROVED FOR PT/OT TOTAL OF 12 VISITS, 21 TO 21  INSURANCE THERAPY BENEFIT:     AQUATIC THERAPY COVERED: NO  MODALITIES COVERED:  YES, JENA HAS BEEN APPROVED    Visit # 12, 2/10 ;PN completed 21; Discharge summary 21   Visits Allowed: 12   Recertification Date:    Physician Follow-Up: 21, also seeing Dr. Gerard calderon. 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 He reports he felt a pop in his elbow  Patient had MRI at [facility] on [address] to get results. Other PMH includes bipolar and anxiety disorder. SUBJECTIVE:  Patient reports continued poor tolerance with Bandit, with reference he believes it is worsening the pain and increasing the edema.  Patient reports understanding demonstrated from patient. All questions were answered for HEP, use of modalities, and wear of Bandit. Areas for Improvement: impaired activity tolerance, impaired ROM, impaired strength and pain  Prognosis: good    GOALS:  Patient Goal: reduce pain    Short Term Goals:  Time Frame: 2 weeks  *    Patient will improve R elbow/forearm strength to 4+/5 to be able to push self up from chair. GOAL PARTIALLY MET R elbow flexion-4-/5 with pain noted   Extension- 4+/5 with no pain. DISCHARGE GOAL   *  Patient will report ability to lift a bag of groceries with no more than minimal difficulty. GOAL PARTIALLY MET Patient reports he can lift around ~5 pounds, if he has his palm up. Patient reports around ~2 pounds with palm down . Patient reports he currently lifts all of his bags of groceries with L hand, to avoid increase pain. DISCHARGE GOAL   * Patient will demonstrate I knowledge of HEP for improved flexibility and eventual strength for lifting. GOAL PARTIALLY MET Patient reports compliance with HEP 3x/day. Patient reports palm up and lighter objects, he can lift with minimal complaint. Patient reports he is not able to lift a gallon of milk yet, as too much pain and discomfort present. DISCHARGE GOAL     Long Term Goals:  Time Frame: 2 weeks  *   Patient will improve UEFS to greater than 73. GOAL NOT MET Patient demonstrated score of 70/80 this date. DISCHARGE GOAL   *  Patient will report no more than 2/10 pain with pushing up from a chair. GOAL NOT MET Patient reports when wearing a bandit, pain is increased 3/10, with reference without a bandit, pain is 2/10. Patient reports there is still \"mild pain\" present throughout any tasks. DISCHARGE GOAL   *  Patient will demonstrate at least 55# of loaded R  for improved strength for RTW tasks. GOAL MET Patient demonstrates R  strength of 67# this date. Patient is currently off work for another 5-6 weeks.  DISCHARGE GOAL     Patient Education:   [] HEP/Education Completed: Plan of Care, Goals, HEP (see below)    AROM elbow, forearm and wrist in pain-free ROM, tricep stretch and self massage added 7/27   Phase II active stretching (wrist flexion with elbow at side in neutral and pronation, wrist flexion with elbow extended forearm in neutral and pronation)   Phase III passive stretching added 8/20  []  No new Education completed  [x]  Reviewed Prior HEP      [x]  Patient verbalized and/or demonstrated understanding of education provided. []  Patient unable to verbalize and/or demonstrate understanding of education provided. Will continue education. [x]  Barriers to learning: none    PLAN:  Treatment Recommendations: Range of Motion, Manual Therapy - Soft Tissue Mobilization, Manual Therapy - Joint Manipulation, Pain Management, Home Exercise Program, Patient Education, Integrative Dry Needling and Modalities    []  Plan of care initiated. []  Continue with current plan of care   Plan to see patient 1 times per week for 2 weeks to address the treatment planned outlined above. []  Modify plan of care as follows:    []  Hold pending physician visit  [x]  Discharge    CPT CODE TIME-IN TIME-OUT TOTAL TIME   75956 (Manual) [id number]   76192 (Th ex) [phone] 17   80104 (Ionto)  [phone]         [phone]         Electronically Signed by:   Jaren CLOUD #[id number]

## 2021-08-31 ENCOUNTER — APPOINTMENT (OUTPATIENT)
Dept: OCCUPATIONAL THERAPY | Age: 36
End: 2021-08-31
Payer: COMMERCIAL

## 2021-11-10 ENCOUNTER — HOSPITAL ENCOUNTER (EMERGENCY)
Age: 36
Discharge: HOME | End: 2021-11-10
Payer: COMMERCIAL

## 2021-11-10 VITALS
RESPIRATION RATE: 18 BRPM | BODY MASS INDEX: 39.46 KG/M2 | DIASTOLIC BLOOD PRESSURE: 68 MMHG | OXYGEN SATURATION: 92 % | TEMPERATURE: 97.4 F | SYSTOLIC BLOOD PRESSURE: 105 MMHG | WEIGHT: 275 LBS | HEART RATE: 80 BPM

## 2021-11-10 DIAGNOSIS — J20.9 ACUTE BRONCHITIS, UNSPECIFIED ORGANISM: Primary | ICD-10-CM

## 2021-11-10 PROCEDURE — 99213 OFFICE O/P EST LOW 20 MIN: CPT

## 2021-11-10 PROCEDURE — 99213 OFFICE O/P EST LOW 20 MIN: CPT | Performed by: NURSE PRACTITIONER

## 2021-11-10 RX ORDER — BENZONATATE 100 MG/1
100-200 CAPSULE ORAL 3 TIMES DAILY PRN
Qty: 42 CAPSULE | Refills: 0 | Status: SHIPPED | OUTPATIENT
Start: 2021-11-10 | End: 2021-11-17

## 2021-11-10 RX ORDER — PREDNISONE 20 MG/1
TABLET ORAL
Qty: 11 TABLET | Refills: 0 | Status: SHIPPED | OUTPATIENT
Start: 2021-11-10 | End: 2021-11-17

## 2021-11-10 RX ORDER — ALBUTEROL SULFATE 90 UG/1
2 AEROSOL, METERED RESPIRATORY (INHALATION) 4 TIMES DAILY PRN
Qty: 54 G | Refills: 0 | Status: SHIPPED | OUTPATIENT
Start: 2021-11-10

## 2021-11-10 RX ORDER — GUAIFENESIN 100 MG/5ML
200 SYRUP ORAL 3 TIMES DAILY PRN
Refills: 0 | COMMUNITY
Start: 2021-11-10

## 2021-11-10 ASSESSMENT — ENCOUNTER SYMPTOMS
WHEEZING: 1
SORE THROAT: 0
COUGH: 1
VOMITING: 0
NAUSEA: 0
SHORTNESS OF BREATH: 0

## 2021-11-10 NOTE — ED TRIAGE NOTES
Pt walked to room 5. Pt here with complaints of a cough, wheezing, fatigue. Pt thinks bronchitis. Started a week ago.

## 2021-11-10 NOTE — ED PROVIDER NOTES
[facility]  Urgent Care Encounter       CHIEF COMPLAINT       Chief Complaint   Patient presents with    Cough     Cough, wheezing, fatigue. Started a week ago. Pt thinks bronchitis. Nurses Notes reviewed and I agree except as noted in the HPI. HISTORY OF PRESENT ILLNESS   Amy Sapp is a 31 y.o. male who presents with complaints of a cough, wheezing, and fatigue that started a week ago. He states this is a new problem. He was tested for COVID-19 on Monday which was negative. He has been taking [place] over-the-counter cough medicine with some relief during the evening. However, he intermittently gets a dry cough during the day that is persistent. He states his employer sent him to the urgent care to be evaluated. He denies any fever, chills, or shortness of breath. The history is provided by the patient. REVIEW OF SYSTEMS     Review of Systems   Constitutional: Positive for fatigue. Negative for chills and fever. HENT: Negative for congestion and sore throat. Respiratory: Positive for cough and wheezing. Negative for shortness of breath. Cardiovascular: Negative for chest pain. Gastrointestinal: Negative for nausea and vomiting. Musculoskeletal: Negative for myalgias. Neurological: Negative for headaches. PAST MEDICAL HISTORY         Diagnosis Date    Anxiety     Bipolar disorder ([facility]     PTSD (post-traumatic stress disorder)     Substance abuse ([facility] [facility]        SURGICALHISTORY     Patient  has a past surgical history that includes knee surgery and back surgery.     CURRENT MEDICATIONS       Previous Medications    ACETAMINOPHEN (TYLENOL) 325 MG TABLET    Take 2 tablets by mouth every 4 hours as needed for Pain or Fever    BUSPIRONE (BUSPAR) 5 MG TABLET    Take 5 mg by mouth 2 times daily    IBUPROFEN (ADVIL;MOTRIN) 600 MG TABLET    Take 1 tablet by mouth every 8 hours as needed for Pain    LAMOTRIGINE (LAMICTAL) 200 MG TABLET    Take 200 mg by mouth daily    OLANZAPINE (ZYPREXA) 5 MG TABLET    Take 5 mg by mouth 2 times daily prn    PRAZOSIN (MINIPRESS) 5 MG CAPSULE    Take 1 capsule by mouth nightly       ALLERGIES     Patient is is allergic to molds & smuts and mushroom extract complex. Patients There is no immunization history for the selected administration types on file for this patient. FAMILY HISTORY     Patient's family history includes Arthritis in his maternal grandmother; Asthma in his maternal grandfather and mother; Diabetes in his maternal grandfather and mother; Heart Disease in his father and maternal grandmother; High Blood Pressure in his father; High Cholesterol in his father; Other in his father, maternal grandfather, and mother. SOCIAL HISTORY     Patient  reports that he has been smoking cigarettes. He has a 7.50 pack-year smoking history. He has quit using smokeless tobacco. He reports current alcohol use of about 2.0 standard drinks of alcohol per week. He reports current drug use. Drug: Marijuana Hulon Gonzalez). PHYSICAL EXAM     ED TRIAGE VITALS  BP: 105/68, Temp: 97.4 °F (36.3 °C), Pulse: 80, Resp: 18, SpO2: 92 %,Estimated body mass index is 39.46 kg/m² as calculated from the following:    Height as of 7/19/20: 5' 10\" (1.778 m). Weight as of this encounter: 275 lb (124.7 kg). ,No LMP for male patient. Physical Exam  Vitals and nursing note reviewed. Constitutional:       General: He is not in acute distress. Appearance: He is ill-appearing. HENT:      Right Ear: Tympanic membrane, ear canal and external ear normal.      Left Ear: Tympanic membrane, ear canal and external ear normal.      Nose: Nose normal.      Mouth/Throat:      Mouth: Mucous membranes are moist.      Pharynx: No posterior oropharyngeal erythema. Cardiovascular:      Rate and Rhythm: Normal rate and regular rhythm. Heart sounds: Normal heart sounds.    Pulmonary:      Effort: Pulmonary effort is normal.      Breath sounds: Examination of the right-lower field reveals decreased breath sounds. Examination of the left-lower field reveals decreased breath sounds. Decreased breath sounds present. Skin:     General: Skin is warm and dry. Neurological:      Mental Status: He is alert and oriented to person, place, and time. DIAGNOSTIC RESULTS     Labs:No results found for this visit on 11/10/21. IMAGING:  None    EKG:  None    URGENT CARE COURSE:     Vitals:    11/10/21 1513   BP: 105/68   Pulse: 80   Resp: 18   Temp: 97.4 °F (36.3 °C)   TempSrc: Temporal   SpO2: 92%   Weight: 275 lb (124.7 kg)       Medications - No data to display       PROCEDURES:  None    FINAL IMPRESSION      1. Acute bronchitis, unspecified organism      DISPOSITION/ PLAN   DISPOSITION Decision To Discharge 11/10/2021 03:40:29 PM     Clinical exam consistent with acute bronchitis. Will start patient on albuterol inhaler, Tessalon over-the-counter as needed, Tessalon Perles for cough during the day, and prednisone tapering dose. Patient advised to take over-the-counter antipyretics as necessary. Follow-up with PCP if worsens or fails to improve. PATIENT REFERRED TO:  Carroll Kasper MD  [address] [facility]  [address] [address]      DISCHARGE MEDICATIONS:  New Prescriptions    ALBUTEROL SULFATE HFA (VENTOLIN HFA) 108 (90 BASE) MCG/ACT INHALER    Inhale 2 puffs into the lungs 4 times daily as needed for Wheezing    GUAIFENESIN (TUSSIN CHEST CONGESTION) 100 MG/5ML SYRUP    Take 10 mLs by mouth 3 times daily as needed for Cough    PREDNISONE (DELTASONE) 20 MG TABLET    Take 1 tablet by mouth 2 times daily for 4 days, THEN 1 tablet daily for 3 days.        Discontinued Medications    LIDOCAINE VISCOUS HCL (XYLOCAINE) 2 % SOLN SOLUTION    Take 5 mLs by mouth every 3 hours as needed for Dental Pain       Current Discharge Medication List          Edson Mckeon APRN - CNP    (Please note that portions of this note were completed with a voice recognition program. Efforts were made to edit the dictations but occasionally words are mis-transcribed.)           Goldie Garcia, APRN - CNP  11/10/21 1462

## 2021-11-10 NOTE — Clinical Note
Paradise Gan was seen and treated in our emergency department on 11/10/2021. He may return to work on 11/11/2021. If you have any questions or concerns, please don't hesitate to call.       Alvarez Wilkes, CRISTINA - CNP

## 2022-01-24 ENCOUNTER — HOSPITAL ENCOUNTER (EMERGENCY)
Age: 37
Discharge: HOME | End: 2022-01-24
Payer: COMMERCIAL

## 2022-01-24 VITALS
TEMPERATURE: 98.8 F | OXYGEN SATURATION: 97 % | HEART RATE: 97 BPM | SYSTOLIC BLOOD PRESSURE: 137 MMHG | RESPIRATION RATE: 18 BRPM | DIASTOLIC BLOOD PRESSURE: 90 MMHG

## 2022-01-24 DIAGNOSIS — M25.521 RIGHT ELBOW PAIN: Primary | ICD-10-CM

## 2022-01-24 PROCEDURE — 99282 EMERGENCY DEPT VISIT SF MDM: CPT

## 2022-01-24 PROCEDURE — 96372 THER/PROPH/DIAG INJ SC/IM: CPT

## 2022-01-24 PROCEDURE — 6360000002 HC RX W HCPCS: Performed by: NURSE PRACTITIONER

## 2022-01-24 RX ORDER — KETOROLAC TROMETHAMINE 30 MG/ML
30 INJECTION, SOLUTION INTRAMUSCULAR; INTRAVENOUS ONCE
Status: COMPLETED | OUTPATIENT
Start: 2022-01-24 | End: 2022-01-24

## 2022-01-24 RX ORDER — METHYLPREDNISOLONE 4 MG/1
TABLET ORAL
Qty: 1 KIT | Refills: 0 | Status: SHIPPED | OUTPATIENT
Start: 2022-01-24 | End: 2022-01-30

## 2022-01-24 RX ORDER — ORPHENADRINE CITRATE 30 MG/ML
60 INJECTION INTRAMUSCULAR; INTRAVENOUS ONCE
Status: COMPLETED | OUTPATIENT
Start: 2022-01-24 | End: 2022-01-24

## 2022-01-24 RX ADMIN — KETOROLAC TROMETHAMINE 30 MG: 30 INJECTION, SOLUTION INTRAMUSCULAR; INTRAVENOUS at 19:30

## 2022-01-24 RX ADMIN — ORPHENADRINE CITRATE 60 MG: 30 INJECTION INTRAMUSCULAR; INTRAVENOUS at 19:30

## 2022-01-24 ASSESSMENT — PAIN SCALES - GENERAL: PAINLEVEL_OUTOF10: 7

## 2022-01-24 NOTE — ED NOTES
Pt to er. Pt c/o rt forearm and elbow pain. States hurt arm at work in May 2021 and has workers comp claim. States did physical therapy and was on restrictions at work. States his elbow \"popped out again\" recently and saw [facility] a week ago and was put on further restrictions. Pt states his arm is burning and tingling now. States doesn't know if he re injured his arm or if it is the old injury.       Jake Bauer, RN  01/24/22 2325

## 2022-01-28 ASSESSMENT — ENCOUNTER SYMPTOMS
CHEST TIGHTNESS: 0
NAUSEA: 0
COUGH: 0
RHINORRHEA: 0
BACK PAIN: 0
VOMITING: 0
EYE REDNESS: 0
ABDOMINAL PAIN: 0

## 2022-01-28 NOTE — ED PROVIDER NOTES
[facility] Emergency Department    CHIEF COMPLAINT       Chief Complaint   Patient presents with    Arm Pain       Nurses Notes reviewed and I agree except as noted in the HPI. HISTORY OF PRESENT ILLNESS    Sol Patrick philip 31 y.o. male who presents to the ED for evaluation of right arm pain that is worse in the elbow. The patient states he has an ongoing work injury that is being treated primarily by [address] [place] [address]. He felt like his elbow popped the other day and he was seen at [address] [place] [address] after. Patient is c/o pain with ROM and he has burning and tingling in his fingers --thumb and forefinger and last two digits. HPI was provided by the patient    REVIEW OF SYSTEMS     Review of Systems   Constitutional: Negative for chills, fatigue and fever. HENT: Negative for congestion, ear discharge, ear pain, postnasal drip and rhinorrhea. Eyes: Negative for redness. Respiratory: Negative for cough and chest tightness. Cardiovascular: Negative for chest pain and leg swelling. Gastrointestinal: Negative for abdominal pain, nausea and vomiting. Genitourinary: Negative for difficulty urinating, dysuria, enuresis, flank pain and hematuria. Musculoskeletal: Positive for arthralgias and myalgias. Negative for back pain and joint swelling. Skin: Negative for rash. Neurological: Positive for numbness. Negative for dizziness, light-headedness and headaches. Psychiatric/Behavioral: Negative for agitation, behavioral problems and confusion. All other systems negative except as noted. PAST MEDICAL HISTORY     Past Medical History:   Diagnosis Date    Anxiety     [facility] disorder ([facility]     PTSD (post-traumatic stress disorder)     Substance abuse ([facility] [address]        SURGICALHISTORY      has a past surgical history that includes knee surgery and back surgery.     CURRENT MEDICATIONS       Discharge Medication List as of 1/24/2022  7:42 PM      CONTINUE these medications which have NOT CHANGED    Details albuterol sulfate HFA (VENTOLIN HFA) 108 (90 Base) MCG/ACT inhaler Inhale 2 puffs into the lungs 4 times daily as needed for Wheezing, Disp-54 g, R-0Normal      guaiFENesin (TUSSIN CHEST CONGESTION) 100 MG/5ML syrup Take 10 mLs by mouth 3 times daily as needed for Cough, R-0OTC      lamoTRIgine (LAMICTAL) 200 MG tablet Take 200 mg by mouth dailyHistorical Med      OLANZapine (ZYPREXA) 5 MG tablet Take 5 mg by mouth 2 times daily prnHistorical Med      busPIRone (BUSPAR) 5 MG tablet Take 5 mg by mouth 2 times dailyHistorical Med      ibuprofen (ADVIL;MOTRIN) 600 MG tablet Take 1 tablet by mouth every 8 hours as needed for Pain, Disp-30 tablet, R-0Normal      prazosin (MINIPRESS) 5 MG capsule Take 1 capsule by mouth nightly, Disp-30 capsule, R-3Normal      acetaminophen (TYLENOL) 325 MG tablet Take 2 tablets by mouth every 4 hours as needed for Pain or Fever, Disp-120 tablet, R-0OTC             ALLERGIES     is allergic to molds & smuts and mushroom extract complex. FAMILY HISTORY     He indicated that his mother is alive. He indicated that his father is alive. He indicated that his maternal grandmother is alive. He indicated that his maternal grandfather is alive. family history includes Arthritis in his maternal grandmother; Asthma in his maternal grandfather and mother; Diabetes in his maternal grandfather and mother; Heart Disease in his father and maternal grandmother; High Blood Pressure in his father; High Cholesterol in his father; Other in his father, maternal grandfather, and mother.     SOCIAL HISTORY       Social History     Socioeconomic History    Marital status:      Spouse name: Not on file    Number of children: 3    Years of education: 15    Highest education level: Not on file   Occupational History    Occupation: unemployed   Tobacco Use    Smoking status: Current Every Day Smoker     Packs/day: 0.75     Years: 10.00     Pack years: 7.50     Types: Cigarettes    Smokeless tobacco: Former User   Vaping Use    Vaping Use: Never used   Substance and Sexual Activity    Alcohol use: Yes     Alcohol/week: 2.0 standard drinks     Types: 2 Shots of liquor per week     Comment: rarely    Drug use: Yes     Types: Marijuana Veldon Bunting)     Comment: 2020    Sexual activity: Yes     Partners: Female   Other Topics Concern    Not on file   Social History Narrative    Not on file     Social Determinants of Health     Financial Resource Strain:     Difficulty of Paying Living Expenses: Not on file   Food Insecurity:     Worried About Running Out of Food in the Last Year: Not on file    Rocio of Food in the Last Year: Not on file   Transportation Needs:     Lack of Transportation (Medical): Not on file    Lack of Transportation (Non-Medical): Not on file   Physical Activity:     Days of Exercise per Week: Not on file    Minutes of Exercise per Session: Not on file   Stress:     Feeling of Stress : Not on file   Social Connections:     Frequency of Communication with Friends and Family: Not on file    Frequency of Social Gatherings with Friends and Family: Not on file    Attends Christianity Services: Not on file    Active Member of [address] [place] [address] [employer] or Organizations: Not on file    Attends Club or Organization Meetings: Not on file    Marital Status: Not on file   Intimate Partner Violence:     Fear of Current or Ex-Partner: Not on file    Emotionally Abused: Not on file    Physically Abused: Not on file    Sexually Abused: Not on file   Housing Stability:     Unable to Pay for Housing in the Last Year: Not on file    Number of [place] in the Last Year: Not on file    Unstable Housing in the Last Year: Not on file       PHYSICAL EXAM     INITIAL VITALS:  oral temperature is 98.8 °F (37.1 °C). His blood pressure is 137/90 (abnormal) and his pulse is 97. His respiration is 18 and oxygen saturation is 97%. Physical Exam  Constitutional:       Appearance: Normal appearance.  He is well-developed. He is not ill-appearing. HENT:      Head: Normocephalic and atraumatic. Nose: Nose normal.      Mouth/Throat:      Mouth: Mucous membranes are moist.      Pharynx: Oropharynx is clear. Eyes:      Conjunctiva/sclera: Conjunctivae normal.   Cardiovascular:      Rate and Rhythm: Normal rate. Pulses: Normal pulses. Pulmonary:      Effort: Pulmonary effort is normal.   Abdominal:      Palpations: Abdomen is soft. Musculoskeletal:      Right elbow: Decreased range of motion (2/2 pain). Tenderness present in medial epicondyle, lateral epicondyle and olecranon process. No radial head tenderness. Arms:       Cervical back: Normal range of motion. Skin:     General: Skin is warm and dry. Capillary Refill: Capillary refill takes less than 2 seconds. Neurological:      General: No focal deficit present. Mental Status: He is alert and oriented to person, place, and time. Psychiatric:         Behavior: Behavior normal.         DIFFERENTIAL DIAGNOSIS:   Strain, sprain, over use injury, exacerbation of chronic injury    DIAGNOSTIC RESULTS     EKG: All EKG's are interpreted by the Emergency Department Physician who eithersigns or Co-signs this chart in the absence of a cardiologist.        RADIOLOGY: non-plainfilm images(s) such as CT, Ultrasound and MRI are read by the radiologist.  Plain radiographic images are visualized and preliminarily interpreted by the emergency physician unless otherwise stated below.   No orders to display         LABS:   Labs Reviewed - No data to display    EMERGENCY DEPARTMENT COURSE:   Vitals:    Vitals:    01/24/22 1824   BP: (!) 137/90   Pulse: 97   Resp: 18   Temp: 98.8 °F (37.1 °C)   TempSrc: Oral   SpO2: 97%                                   Internal Administration   First Dose      Second Dose           Last COVID Lab No results found for: SARS-COV-2, SARS-COV-2 RNA, SARS-COV-2, SARS-COV-2, SARS-COV-2 BY PCR, SARS-COV-2, SARS-COV-2, SARS-COV-2         MDM      Patient seen evaluate in the emergency room for right arm injury/pain. Patient is treated with a injection of Toradol and Norflex. There was no acute injury. Patient likely needs a repeat MRI. He will be discharged home with some steroids and deferred to [facility] for further evaluation. Medications   ketorolac (TORADOL) injection 30 mg (30 mg IntraMUSCular Given 1/24/22 1930)   orphenadrine (NORFLEX) injection 60 mg (60 mg IntraMUSCular Given 1/24/22 1930)       Please note that the patient was evaluated during a pandemic. All efforts were made for HIPPA compliance as well as provision of appropriate care. Patient was seen independently by myself. The patient's final impression and disposition and plan was determined by myself. Strict return precautions and follow up instructions were discussed with the patient prior to discharge, with which the patient agrees. Physical assessment findings, diagnostic testing(s) if applicable, and vital signs reviewed with patient/patient representative. Questions answered. Medications asdirected, including OTC medications for supportive care. Education provided on medications. Differential diagnosis(s) discussed with patient/patient representative. Home care/self care instructions reviewed withpatient/patient representative. Patient is to follow-up with family care provider in 2-3 days if no improvement. Patient is to go to the emergency department if symptoms worsen. Patient/patient representative isaware of care plan, questions answered, verbalizes understanding and is in agreement. CRITICAL CARE:   None    CONSULTS:  None    PROCEDURES:  None    FINAL IMPRESSION     1.  Right elbow pain          DISPOSITION/PLAN   DISPOSITION Decision To Discharge 01/24/2022 07:21:39 PM      PATIENT REFERREDTO:  Yissel Ivey [address]  [phone]  Schedule an appointment as soon as possible for a visit in 2 days  For follow up      DISCHARGE MEDICATIONS:  Discharge Medication List as of 1/24/2022  7:42 PM      START taking these medications    Details   methylPREDNISolone (MEDROL ROHITH,) 4 MG tablet Take by mouth., Disp-1 kit, R-0Normal             (Please note that portions of this note were completed with a voice recognition program.  Efforts were made to edit the dictations but occasionally words are mis-transcribed.)         Daxa Diss, APRN - CNP          Daxa Moralez, APRN - CNP  01/28/22 7806

## 2022-09-02 ENCOUNTER — HOSPITAL ENCOUNTER (EMERGENCY)
Age: 37
Discharge: HOME | End: 2022-09-02
Payer: MEDICAID

## 2022-09-02 VITALS
OXYGEN SATURATION: 95 % | TEMPERATURE: 98.4 F | SYSTOLIC BLOOD PRESSURE: 123 MMHG | RESPIRATION RATE: 16 BRPM | DIASTOLIC BLOOD PRESSURE: 77 MMHG | HEART RATE: 75 BPM

## 2022-09-02 DIAGNOSIS — B97.89 VIRAL SINUSITIS: Primary | ICD-10-CM

## 2022-09-02 DIAGNOSIS — J32.9 VIRAL SINUSITIS: Primary | ICD-10-CM

## 2022-09-02 PROCEDURE — 99213 OFFICE O/P EST LOW 20 MIN: CPT | Performed by: EMERGENCY MEDICINE

## 2022-09-02 PROCEDURE — 99213 OFFICE O/P EST LOW 20 MIN: CPT

## 2022-09-02 RX ORDER — FLUTICASONE PROPIONATE 50 MCG
1 SPRAY, SUSPENSION (ML) NASAL DAILY
Qty: 16 G | Refills: 0 | Status: SHIPPED | OUTPATIENT
Start: 2022-09-02

## 2022-09-02 RX ORDER — PREDNISONE 20 MG/1
40 TABLET ORAL DAILY
Qty: 10 TABLET | Refills: 0 | Status: SHIPPED | OUTPATIENT
Start: 2022-09-02 | End: 2022-09-07

## 2022-09-02 RX ORDER — BENZONATATE 100 MG/1
100 CAPSULE ORAL 3 TIMES DAILY PRN
COMMUNITY

## 2022-09-02 ASSESSMENT — ENCOUNTER SYMPTOMS
ABDOMINAL PAIN: 0
SORE THROAT: 0
SHORTNESS OF BREATH: 0
SINUS PAIN: 1
COUGH: 1
SINUS PRESSURE: 1
STRIDOR: 0
WHEEZING: 0

## 2022-09-02 ASSESSMENT — PAIN - FUNCTIONAL ASSESSMENT: PAIN_FUNCTIONAL_ASSESSMENT: 0-10

## 2022-09-02 ASSESSMENT — PAIN SCALES - GENERAL: PAINLEVEL_OUTOF10: 1

## 2022-09-02 NOTE — ED PROVIDER NOTES
GUAIFENESIN (TUSSIN CHEST CONGESTION) 100 MG/5ML SYRUP    Take 10 mLs by mouth 3 times daily as needed for Cough    LAMOTRIGINE (LAMICTAL) 200 MG TABLET    Take 200 mg by mouth daily    OLANZAPINE (ZYPREXA) 5 MG TABLET    Take 5 mg by mouth 2 times daily prn    PRAZOSIN (MINIPRESS) 5 MG CAPSULE    Take 1 capsule by mouth nightly       ALLERGIES     Patient is is allergic to molds & smuts and mushroom extract complex. Patients There is no immunization history for the selected administration types on file for this patient. FAMILY HISTORY     Patient's family history includes Arthritis in his maternal grandmother; Asthma in his maternal grandfather and mother; Diabetes in his maternal grandfather and mother; Heart Disease in his father and maternal grandmother; High Blood Pressure in his father; High Cholesterol in his father; Other in his father, maternal grandfather, and mother. SOCIAL HISTORY     Patient  reports that he has been smoking cigarettes. He has a 7.50 pack-year smoking history. He has quit using smokeless tobacco. He reports current alcohol use of about 2.0 standard drinks per week. He reports current drug use. Drug: Marijuana Nguyễn Blow). PHYSICAL EXAM     ED TRIAGE VITALS  BP: 123/77, Temp: 98.4 °F (36.9 °C), Heart Rate: 75, Resp: 16, SpO2: 95 %,Estimated body mass index is 39.46 kg/m² as calculated from the following:    Height as of 7/19/20: 5' 10\" (1.778 m). Weight as of 11/10/21: 275 lb (124.7 kg). ,No LMP for male patient. Physical Exam  Constitutional:       General: He is not in acute distress. Appearance: He is normal weight. He is not ill-appearing. HENT:      Head: Normocephalic and atraumatic. Right Ear: Tympanic membrane and ear canal normal.      Left Ear: Tympanic membrane and ear canal normal.      Nose: Congestion and rhinorrhea present. Right Sinus: Frontal sinus tenderness present. No maxillary sinus tenderness.       Left Sinus: Frontal sinus tenderness

## 2022-09-02 NOTE — ED TRIAGE NOTES
Had tested positive for covid 2 weeks ago, sinus and chest congestion has progressed and gotten worse, has been more short of breath, having a productive cough (worse in the morning)

## 2022-09-02 NOTE — Clinical Note
Mynor Zavala was seen and treated in our emergency department on 9/2/2022. He may return to work on 09/03/2022. If you have any questions or concerns, please don't hesitate to call.       Caroline Ohara, CRISTINA - CNP

## 2023-05-23 ENCOUNTER — HOSPITAL ENCOUNTER (EMERGENCY)
Age: 38
Discharge: HOME | End: 2023-05-23
Attending: EMERGENCY MEDICINE
Payer: MEDICAID

## 2023-05-23 ENCOUNTER — APPOINTMENT (OUTPATIENT)
Dept: GENERAL RADIOLOGY | Age: 38
End: 2023-05-23
Payer: MEDICAID

## 2023-05-23 VITALS
TEMPERATURE: 97.9 F | DIASTOLIC BLOOD PRESSURE: 88 MMHG | BODY MASS INDEX: 39.37 KG/M2 | OXYGEN SATURATION: 95 % | HEIGHT: 70 IN | RESPIRATION RATE: 25 BRPM | HEART RATE: 93 BPM | WEIGHT: 275 LBS | SYSTOLIC BLOOD PRESSURE: 124 MMHG

## 2023-05-23 DIAGNOSIS — R07.9 CHEST PAIN, UNSPECIFIED TYPE: Primary | ICD-10-CM

## 2023-05-23 LAB
ANION GAP SERPL CALC-SCNC: 16 MEQ/L (ref 8–16)
BASOPHILS ABSOLUTE: 0 THOU/MM3 (ref 0–0.1)
BASOPHILS NFR BLD AUTO: 0.3 %
BUN SERPL-MCNC: 11 MG/DL (ref 7–22)
CALCIUM SERPL-MCNC: 9.4 MG/DL (ref 8.5–10.5)
CHLORIDE SERPL-SCNC: 99 MEQ/L (ref 98–111)
CO2 SERPL-SCNC: 23 MEQ/L (ref 23–33)
CREAT SERPL-MCNC: 0.8 MG/DL (ref 0.4–1.2)
DEPRECATED RDW RBC AUTO: 42.6 FL (ref 35–45)
EKG ATRIAL RATE: 104 BPM
EKG P AXIS: 41 DEGREES
EKG P-R INTERVAL: 144 MS
EKG Q-T INTERVAL: 326 MS
EKG QRS DURATION: 82 MS
EKG QTC CALCULATION (BAZETT): 428 MS
EKG R AXIS: 3 DEGREES
EKG T AXIS: 41 DEGREES
EKG VENTRICULAR RATE: 104 BPM
EOSINOPHIL NFR BLD AUTO: 0.9 %
EOSINOPHILS ABSOLUTE: 0.1 THOU/MM3 (ref 0–0.4)
ERYTHROCYTE [DISTWIDTH] IN BLOOD BY AUTOMATED COUNT: 13 % (ref 11.5–14.5)
GFR SERPL CREATININE-BSD FRML MDRD: > 60 ML/MIN/1.73M2
GLUCOSE SERPL-MCNC: 150 MG/DL (ref 70–108)
HCT VFR BLD AUTO: 48.8 % (ref 42–52)
HGB BLD-MCNC: 16.6 GM/DL (ref 14–18)
IMM GRANULOCYTES # BLD AUTO: 0.03 THOU/MM3 (ref 0–0.07)
IMM GRANULOCYTES NFR BLD AUTO: 0.3 %
LYMPHOCYTES ABSOLUTE: 1.7 THOU/MM3 (ref 1–4.8)
LYMPHOCYTES NFR BLD AUTO: 15.2 %
MCH RBC QN AUTO: 30.9 PG (ref 26–33)
MCHC RBC AUTO-ENTMCNC: 34 GM/DL (ref 32.2–35.5)
MCV RBC AUTO: 90.9 FL (ref 80–94)
MONOCYTES ABSOLUTE: 0.8 THOU/MM3 (ref 0.4–1.3)
MONOCYTES NFR BLD AUTO: 6.8 %
NEUTROPHILS NFR BLD AUTO: 76.5 %
NRBC BLD AUTO-RTO: 0 /100 WBC
NT-PROBNP SERPL IA-MCNC: < 36 PG/ML (ref 0–124)
OSMOLALITY SERPL CALC.SUM OF ELEC: 277.9 MOSMOL/KG (ref 275–300)
PLATELET # BLD AUTO: 290 THOU/MM3 (ref 130–400)
PMV BLD AUTO: 10.8 FL (ref 9.4–12.4)
POTASSIUM SERPL-SCNC: 3.7 MEQ/L (ref 3.5–5.2)
RBC # BLD AUTO: 5.37 MILL/MM3 (ref 4.7–6.1)
SEGMENTED NEUTROPHILS ABSOLUTE COUNT: 8.8 THOU/MM3 (ref 1.8–7.7)
SODIUM SERPL-SCNC: 138 MEQ/L (ref 135–145)
TROPONIN T: < 0.01 NG/ML
WBC # BLD AUTO: 11.5 THOU/MM3 (ref 4.8–10.8)

## 2023-05-23 PROCEDURE — 71045 X-RAY EXAM CHEST 1 VIEW: CPT

## 2023-05-23 PROCEDURE — 36415 COLL VENOUS BLD VENIPUNCTURE: CPT

## 2023-05-23 PROCEDURE — 83880 ASSAY OF NATRIURETIC PEPTIDE: CPT

## 2023-05-23 PROCEDURE — 84484 ASSAY OF TROPONIN QUANT: CPT

## 2023-05-23 PROCEDURE — 85025 COMPLETE CBC W/AUTO DIFF WBC: CPT

## 2023-05-23 PROCEDURE — 93005 ELECTROCARDIOGRAM TRACING: CPT | Performed by: EMERGENCY MEDICINE

## 2023-05-23 PROCEDURE — 99285 EMERGENCY DEPT VISIT HI MDM: CPT

## 2023-05-23 PROCEDURE — 80048 BASIC METABOLIC PNL TOTAL CA: CPT

## 2023-05-23 PROCEDURE — 93010 ELECTROCARDIOGRAM REPORT: CPT | Performed by: INTERNAL MEDICINE

## 2023-05-23 ASSESSMENT — PAIN SCALES - GENERAL: PAINLEVEL_OUTOF10: 2

## 2023-05-23 ASSESSMENT — PAIN DESCRIPTION - PAIN TYPE: TYPE: ACUTE PAIN

## 2023-05-23 ASSESSMENT — PAIN DESCRIPTION - DIRECTION: RADIATING_TOWARDS: L ARM

## 2023-05-23 ASSESSMENT — PAIN DESCRIPTION - DESCRIPTORS: DESCRIPTORS: DULL;ACHING

## 2023-05-23 ASSESSMENT — PAIN DESCRIPTION - ORIENTATION: ORIENTATION: LEFT

## 2023-05-23 ASSESSMENT — PAIN - FUNCTIONAL ASSESSMENT: PAIN_FUNCTIONAL_ASSESSMENT: 0-10

## 2023-05-23 ASSESSMENT — PAIN DESCRIPTION - LOCATION: LOCATION: CHEST

## 2023-05-23 NOTE — ED TRIAGE NOTES
Patient presents to ED from home with complaints of chest pain, hypertension, and \"feeling like I will pass out\". Patient states left side chest pain by armpit that does radiate to L arm. Patient states when he stands up he feels dizzy. Patient states this started 1630. Patient denies any shortness of breath, n/v/d, or trouble using restroom at this time. Patient is alert and oriented, VSS, and ambulatory during triage.

## 2023-05-24 NOTE — ED NOTES
Discharge instructions and follow up discussed with pt. Pt verbalized understanding and denied further questions. LDA removed. Pt discharged with all belongings.         Janna Hermosillo RN  05/23/23 4789

## 2023-05-24 NOTE — ED NOTES
Patient resting in bed. Respirations easy and unlabored. No distress noted. Call light within reach.        Toshia Acosta RN  05/23/23 5358

## 2023-05-25 NOTE — ED PROVIDER NOTES
I performed a history and physical examination of the patient and discussed management with the resident. I reviewed the residents note and agree with the documented findings and plan of care. Any areas of disagreement are noted on the chart. I was personally present for the key portions of any procedures. I have documented in the chart those procedures where I was not present during the key portions. I have reviewed the emergency nurses triage note. I agree with the chief complaint, past medical history, past surgical history, allergies, medications, social and family history as documented unless otherwise noted below. Documentation of the HPI, Physical Exam and Medical Decision Making performed by medical students or scribes is based on my personal performance of the HPI, PE and MDM. For Phys Assistant/ Nurse Practitioner cases/documentation I have personally evaluated this patient and have completed at least one if not all key elements of the E/M (history, physical exam, and MDM). My findings are as noted below. In other words, I personally saw and examined the patient I have reviewed and agreed with the resident findings including all diagnostic interpretations and treatment plans as written. I was present for the key portion of any procedures performed and the inclusive time noted in any critical care statement. Today for complaint of chest pain hypertension he felt some lightheadedness. Patient states he was working when he did this. Patient denies any drugs or alcohol. He does not partake in energy drinks. No real cardiac history in the past.  Patient has not had any shortness of breath. He has no syncopal or presyncopal episodes. EKG reveals sinus tachycardia, normal axis, ventricular rate of 104 TX interval 144 QRS duration 82 QT interval 326 QTc 428.       XR CHEST PORTABLE   Final Result   No acute cardiopulmonary disease            **This report has been created using voice recognition
10:14 PM          FINAL IMPRESSION      1.  Chest pain, unspecified type          DISPOSITION/PLAN   DISPOSITION Decision To Discharge 05/23/2023 10:09:59 PM      OUTPATIENT FOLLOW UP THE PATIENT:  Litzy Romo MD  [address]  [place] [address]  [address] [facility]  [phone]    Schedule an appointment as soon as possible for a visit in 1 day        MD Norman Malcolm MD  Resident  05/25/23 2590

## 2024-02-27 ENCOUNTER — HOSPITAL ENCOUNTER (EMERGENCY)
Age: 39
Discharge: HOME | End: 2024-02-27
Payer: MEDICAID

## 2024-02-27 ENCOUNTER — APPOINTMENT (OUTPATIENT)
Dept: ULTRASOUND IMAGING | Age: 39
End: 2024-02-27
Payer: MEDICAID

## 2024-02-27 VITALS
SYSTOLIC BLOOD PRESSURE: 118 MMHG | TEMPERATURE: 97.9 F | HEART RATE: 90 BPM | RESPIRATION RATE: 14 BRPM | DIASTOLIC BLOOD PRESSURE: 88 MMHG | OXYGEN SATURATION: 95 %

## 2024-02-27 DIAGNOSIS — N50.82 SCROTAL PAIN: Primary | ICD-10-CM

## 2024-02-27 DIAGNOSIS — I86.1: ICD-10-CM

## 2024-02-27 LAB
BILIRUB UR QL STRIP.AUTO: NEGATIVE
CHARACTER UR: CLEAR
COLOR: YELLOW
GLUCOSE UR QL STRIP.AUTO: NEGATIVE MG/DL
HGB UR QL STRIP.AUTO: NEGATIVE
KETONES UR QL STRIP.AUTO: ABNORMAL
NITRITE UR QL STRIP: NEGATIVE
PH UR STRIP.AUTO: 5.5 [PH] (ref 5–9)
PROT UR STRIP.AUTO-MCNC: NEGATIVE MG/DL
SP GR UR REFRACT.AUTO: 1.02 (ref 1–1.03)
UROBILINOGEN, URINE: 0.2 EU/DL (ref 0–1)
WBC #/AREA URNS HPF: NEGATIVE /[HPF]

## 2024-02-27 PROCEDURE — 99284 EMERGENCY DEPT VISIT MOD MDM: CPT

## 2024-02-27 PROCEDURE — 81003 URINALYSIS AUTO W/O SCOPE: CPT

## 2024-02-27 PROCEDURE — 6370000000 HC RX 637 (ALT 250 FOR IP): Performed by: NURSE PRACTITIONER

## 2024-02-27 PROCEDURE — 76870 US EXAM SCROTUM: CPT

## 2024-02-27 RX ORDER — IBUPROFEN 600 MG/1
600 TABLET ORAL 3 TIMES DAILY
Qty: 30 TABLET | Refills: 0 | Status: SHIPPED | OUTPATIENT
Start: 2024-02-27

## 2024-02-27 RX ORDER — HYDROCODONE BITARTRATE AND ACETAMINOPHEN 5; 325 MG/1; MG/1
1 TABLET ORAL ONCE
Status: COMPLETED | OUTPATIENT
Start: 2024-02-27 | End: 2024-02-27

## 2024-02-27 RX ORDER — HYDROCODONE BITARTRATE AND ACETAMINOPHEN 5; 325 MG/1; MG/1
1 TABLET ORAL EVERY 6 HOURS PRN
Qty: 12 TABLET | Refills: 0 | Status: SHIPPED | OUTPATIENT
Start: 2024-02-27 | End: 2024-03-01

## 2024-02-27 RX ORDER — HYDROCODONE BITARTRATE AND ACETAMINOPHEN 5; 325 MG/1; MG/1
1 TABLET ORAL ONCE
Status: DISCONTINUED | OUTPATIENT
Start: 2024-02-27 | End: 2024-02-27 | Stop reason: HOSPADM

## 2024-02-27 RX ADMIN — HYDROCODONE BITARTRATE AND ACETAMINOPHEN 1 TABLET: 5; 325 TABLET ORAL at 20:38

## 2024-02-27 NOTE — ED NOTES
Pt to er. Pt c/o lt testicle pain that started today. Denies any swelling. States feels like it is being squeezed. Denies any urinary symptoms.

## 2024-02-28 NOTE — DISCHARGE INSTRUCTIONS
Testicular elevation.  Use medication as directed.  Please return for new or worsening symptoms.  Follow up as directed.

## 2024-03-01 ENCOUNTER — OFFICE VISIT (OUTPATIENT)
Dept: UROLOGY | Age: 39
End: 2024-03-01
Payer: MEDICAID

## 2024-03-01 VITALS — HEIGHT: 70 IN | RESPIRATION RATE: 16 BRPM | WEIGHT: 275 LBS | BODY MASS INDEX: 39.37 KG/M2

## 2024-03-01 DIAGNOSIS — N50.82 ACUTE PAIN IN SCROTUM: ICD-10-CM

## 2024-03-01 DIAGNOSIS — I86.1 VARICOCELE: ICD-10-CM

## 2024-03-01 DIAGNOSIS — N43.3 HYDROCELE IN ADULT: Primary | ICD-10-CM

## 2024-03-01 PROCEDURE — 4004F PT TOBACCO SCREEN RCVD TLK: CPT | Performed by: NURSE PRACTITIONER

## 2024-03-01 PROCEDURE — G8427 DOCREV CUR MEDS BY ELIG CLIN: HCPCS | Performed by: NURSE PRACTITIONER

## 2024-03-01 PROCEDURE — G8484 FLU IMMUNIZE NO ADMIN: HCPCS | Performed by: NURSE PRACTITIONER

## 2024-03-01 PROCEDURE — G8417 CALC BMI ABV UP PARAM F/U: HCPCS | Performed by: NURSE PRACTITIONER

## 2024-03-01 PROCEDURE — 99204 OFFICE O/P NEW MOD 45 MIN: CPT | Performed by: NURSE PRACTITIONER

## 2024-03-01 NOTE — PROGRESS NOTES
[facility] [place] [facility]  [facility]  [address]  [address]  [facility] [address]  Dept: [phone]  Loc: [phone]    Visit Date: 3/1/2024        HPI:     Carlos Chang is a 38 y.o. male who presents today for:  Chief Complaint   Patient presents with    Other     ER follow-up       HPI  New patient presents to urology clinic for ER follow-up.     Carlos was seen in ER 2/27/2024 with c/o scrotal pain. U/a negative for infection. Denies flank pain, suprapubic pressure, gross hematuria, dysuria, fever or chills.     He reports continued left scrotal pain. Scrotal ultrasound reviewed. Patient requesting repeat ultrasound d/t continued/worsening pain.    Hx of vasectomy 6 years ago.     Current Outpatient Medications   Medication Sig Dispense Refill    HYDROcodone-acetaminophen (NORCO) 5-325 MG per tablet Take 1 tablet by mouth every 6 hours as needed for Pain for up to 3 days. Intended supply: 3 days. Take lowest dose possible to manage pain Max Daily Amount: 4 tablets 12 tablet 0    ibuprofen (ADVIL;MOTRIN) 600 MG tablet Take 1 tablet by mouth in the morning, at noon, and at bedtime 30 tablet 0    benzonatate (TESSALON) 100 MG capsule Take 1 capsule by mouth 3 times daily as needed for Cough      fluticasone (FLONASE) 50 MCG/ACT nasal spray 1 spray by Each Nostril route daily 16 g 0    loratadine-pseudoephedrine (CLARITIN-D 12HR) 5-120 MG per extended release tablet Take 1 tablet by mouth 2 times daily 30 tablet 0    albuterol sulfate HFA (VENTOLIN HFA) 108 (90 Base) MCG/ACT inhaler Inhale 2 puffs into the lungs 4 times daily as needed for Wheezing 54 g 0    guaiFENesin (TUSSIN CHEST CONGESTION) 100 MG/5ML syrup Take 10 mLs by mouth 3 times daily as needed for Cough  0    lamoTRIgine (LAMICTAL) 200 MG tablet Take 1 tablet by mouth daily      OLANZapine (ZYPREXA) 5 MG tablet Take 1 tablet by mouth 2 times daily prn      prazosin (MINIPRESS) 5 MG capsule Take 1

## 2024-03-01 NOTE — ED PROVIDER NOTES
[facility] EMERGENCY DEPT      EMERGENCY MEDICINE     Pt Name: Carlos Chang  MRN: [id number]  Birthdate 1985  Date of evaluation: 2/27/2024  Provider: CRISTINA Gonzales CNP    CHIEF COMPLAINT       Chief Complaint   Patient presents with    Testicle Pain     LT     HISTORY OF PRESENT ILLNESS   Carlos Chang is a pleasant 38 y.o. male who presents to the emergency department from home with c/o left testicle pain and left upper leg pain.  Denies any injury.  No discharge.  Denies concern for STD.  No fevers.  Pain is like squeezing.        History is obtained from:  patient  PASTMEDICAL HISTORY     Past Medical History:   Diagnosis Date    Anxiety     Bipolar disorder ([facility])     PTSD (post-traumatic stress disorder)     Substance abuse ([facility])        Patient Active Problem List   Diagnosis Code    Panic disorder without agoraphobia with moderate panic attacks F41.0    Closed nondisplaced fracture of medial malleolus of right tibia S82.54XA    Closed fracture of right ankle with routine healing S82.891D    Contusion of shoulder, left S40.012A    Anxiety F41.9    Strain of infraspinatus muscle S46.819A    Strain of left deltoid muscle S46.812A    Tear of left deltoid muscle S46.812A    Multiple abrasions T07.XXXA    Contusion of knee, right S80.01XA    Strain of left patellar tendon S86.812A    Major depressive disorder, single episode F32.9     SURGICAL HISTORY       Past Surgical History:   Procedure Laterality Date    BACK SURGERY      removed cysts from back    KNEE SURGERY         CURRENT MEDICATIONS       Discharge Medication List as of 2/27/2024  8:57 PM        CONTINUE these medications which have NOT CHANGED    Details   benzonatate (TESSALON) 100 MG capsule Take 100 mg by mouth 3 times daily as needed for CoughHistorical Med      fluticasone (FLONASE) 50 MCG/ACT nasal spray 1 spray by Each Nostril route daily, Disp-16 g, R-0Normal      loratadine-pseudoephedrine (CLARITIN-D 12HR) 5-120 MG  answered, verbalizes understanding and is in agreement.     ED Medications administered this visit:  (None if blank)  Medications   HYDROcodone-acetaminophen (NORCO) 5-325 MG per tablet 1 tablet (1 tablet Oral Given 2/27/24 2038)         CONSULTS:  None    PROCEDURES: (None if blank)  Procedures:     CRITICAL CARE: (None if blank)      DISCHARGE PRESCRIPTIONS: (None if blank)  Discharge Medication List as of 2/27/2024  8:57 PM        START taking these medications    Details   HYDROcodone-acetaminophen (NORCO) 5-325 MG per tablet Take 1 tablet by mouth every 6 hours as needed for Pain for up to 3 days. Intended supply: 3 days. Take lowest dose possible to manage pain Max Daily Amount: 4 tablets, Disp-12 tablet, R-0Normal      ibuprofen (ADVIL;MOTRIN) 600 MG tablet Take 1 tablet by mouth in the morning, at noon, and at bedtime, Disp-30 tablet, R-0Normal             FINAL IMPRESSION      1. Scrotal pain    2. Varices, scrotum          DISPOSITION/PLAN   DISPOSITION Decision To Discharge 02/27/2024 08:53:31 PM      OUTPATIENT FOLLOW UP THE PATIENT:  [facility]  [address] [place] [address]  [phone]  Go on 3/1/2024  @ 0845, For follow up      CRISTINA Gonzales CNP, Kristy J, APRN - CNP  03/01/24 0629       Brenda Baltazar APRN - CNP  03/01/24 0629

## 2024-03-12 ENCOUNTER — HOSPITAL ENCOUNTER (OUTPATIENT)
Dept: ULTRASOUND IMAGING | Age: 39
Discharge: HOME | End: 2024-03-12
Payer: MEDICAID

## 2024-03-12 DIAGNOSIS — N50.82 ACUTE PAIN IN SCROTUM: ICD-10-CM

## 2024-03-12 PROCEDURE — 76870 US EXAM SCROTUM: CPT

## 2024-03-19 ENCOUNTER — OFFICE VISIT (OUTPATIENT)
Dept: UROLOGY | Age: 39
End: 2024-03-19
Payer: MEDICAID

## 2024-03-19 VITALS — WEIGHT: 275 LBS | RESPIRATION RATE: 16 BRPM | BODY MASS INDEX: 39.37 KG/M2 | HEIGHT: 70 IN

## 2024-03-19 DIAGNOSIS — N43.3 HYDROCELE IN ADULT: Primary | ICD-10-CM

## 2024-03-19 DIAGNOSIS — I86.1 VARICOCELE: ICD-10-CM

## 2024-03-19 DIAGNOSIS — N50.819 PAIN IN TESTICLE, UNSPECIFIED LATERALITY: Primary | ICD-10-CM

## 2024-03-19 DIAGNOSIS — N50.82 ACUTE PAIN IN SCROTUM: ICD-10-CM

## 2024-03-19 PROCEDURE — G8484 FLU IMMUNIZE NO ADMIN: HCPCS | Performed by: UROLOGY

## 2024-03-19 PROCEDURE — 99214 OFFICE O/P EST MOD 30 MIN: CPT | Performed by: UROLOGY

## 2024-03-19 PROCEDURE — 4004F PT TOBACCO SCREEN RCVD TLK: CPT | Performed by: UROLOGY

## 2024-03-19 PROCEDURE — G8417 CALC BMI ABV UP PARAM F/U: HCPCS | Performed by: UROLOGY

## 2024-03-19 PROCEDURE — G8427 DOCREV CUR MEDS BY ELIG CLIN: HCPCS | Performed by: UROLOGY

## 2024-03-19 RX ORDER — GABAPENTIN 300 MG/1
300 CAPSULE ORAL 2 TIMES DAILY
Qty: 60 CAPSULE | Refills: 4 | Status: SHIPPED | OUTPATIENT
Start: 2024-03-19 | End: 2024-04-18

## 2024-06-10 ENCOUNTER — HOSPITAL ENCOUNTER (EMERGENCY)
Age: 39
Discharge: HOME | End: 2024-06-10
Payer: MEDICAID

## 2024-06-10 VITALS
TEMPERATURE: 98.1 F | RESPIRATION RATE: 19 BRPM | SYSTOLIC BLOOD PRESSURE: 120 MMHG | HEART RATE: 79 BPM | DIASTOLIC BLOOD PRESSURE: 82 MMHG | OXYGEN SATURATION: 97 %

## 2024-06-10 DIAGNOSIS — S76.212A STRAIN OF ADDUCTOR MAGNUS MUSCLE OF LEFT LOWER EXTREMITY, INITIAL ENCOUNTER: Primary | ICD-10-CM

## 2024-06-10 PROCEDURE — 99213 OFFICE O/P EST LOW 20 MIN: CPT

## 2024-06-10 RX ORDER — IBUPROFEN 400 MG/1
400 TABLET ORAL EVERY 6 HOURS PRN
Qty: 120 TABLET | Refills: 3 | Status: SHIPPED | OUTPATIENT
Start: 2024-06-10

## 2024-06-10 RX ORDER — CYCLOBENZAPRINE HCL 5 MG
5 TABLET ORAL 3 TIMES DAILY PRN
Qty: 30 TABLET | Refills: 0 | Status: SHIPPED | OUTPATIENT
Start: 2024-06-10 | End: 2024-06-20

## 2024-06-10 NOTE — DISCHARGE INSTRUCTIONS
Per HPI and physical assessment your left pain is most likely a pulled muscle.  Skeletal muscle relaxant such as cyclobenzaprine, anti-inflammatories like ibuprofen and exercises are the best treatment for these.  The skeletal muscle pulls can take significant duration to improve.  Additionally there is a possibility of a partially torn muscle, this is unlikely but always possible.    Attempted treatment with provided medication here, make an appointment with family doctor, Dr. Katharina Son.  If you are unable to control your pain Dr. Son can provide referral to best specialty group to manage his pain including potential to neurology versus orthopedics.  Sometimes you need additional imaging that we are not capable of an urgent care to decipher the best specialist for you to attend.    Okay to use cyclobenzaprine, this likely will cause drowsiness so please not operate machinery/drive while on this medication.    If you are having urgent/emergent medical concerns including decreased ability to walk please attend ER for evaluation.    I hope you are feeling better soon!

## 2024-06-10 NOTE — ED PROVIDER NOTES
[facility] URGENT CARE      URGENT CARE     Pt Name: Carlos Chang  MRN: [id number]  Birthdate 1985  Date of evaluation: 6/10/2024  Provider: CRISTINA Dominguez CNP    Urgent Care Encounter     CHIEF COMPLAINT       Chief Complaint   Patient presents with    Groin Pain     left    Groin Swelling     left     HISTORY OF PRESENT ILLNESS   Carlos Chang is a 38 y.o. male who presents to urgent care with left leg pain, located upper portion medial aspect.  Admits to history of this has been ongoing for 3 months and has been intermittent.  Explains that he had an issue with his cat where \"I pretty much did the splits at home when my cat ran under me.\"  Describes intermittent ongoing testicular/leg pain since then.  Was started on Neurontin per urology after multiple scrotal ultrasounds that revealed no acute/emergent abnormality.  \"The testicular pain is now better but has been masking my leg pain.\"  Denies decreasing ability to walk, sensation is intermittent/fleeting, worse with walking and replicable.  Located proximal portion medial aspect of his left leg predominantly located upper leg and rarely extending to the distal leg.  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  Describes it so bad at times that \"I just want to rip my leg off.\"  Attempted treatment with Tylenol/ibuprofen/heat/ice and marijuana that has provided significant improvement of pain but incomplete resolution.  Request referral to muscle specialist today \"I just want a specialist to look at this.\"  Denies fevers or chills.  Denies altered sensation or deformity.  Denies bruising or swelling or erythema.    History obtained from patient  URGENT CARE TIMELINE      ED Course as of 06/10/24 1732   Mon Cyrus 10, 2024   1711  Reviewed historical urology notes and repeat scrotal ultrasound that reveals fleeting hydrocele/varicocele that was not present on most recent ultrasound.  found for this visit on 06/10/24.  IMAGING:  No orders to display     EKG:  URGENT CARE COURSE:     Vitals:    06/10/24 1708   BP: 120/82   Pulse: 79   Resp: 19   Temp: 98.1 °F (36.7 °C)   SpO2: 97%     Medications - No data to display  PROCEDURES: (None if blank)  Procedures:   FINAL IMPRESSION      1. Strain of adductor rebekah muscle of left lower extremity, initial encounter      DISPOSITION/ PLAN   PATIENT REFERRED TO:  Katharina Dupree MD  [address] / [facility] [address]  DISCHARGE MEDICATIONS:  Discharge Medication List as of 6/10/2024  5:29 PM        START taking these medications    Details   cyclobenzaprine (FLEXERIL) 5 MG tablet Take 1 tablet by mouth 3 times daily as needed for Muscle spasms, Disp-30 tablet, R-0Normal           Discharge Medication List as of 6/10/2024  5:29 PM        Discharge Medication List as of 6/10/2024  5:29 PM        CONTINUE these medications which have CHANGED    Details   ibuprofen (IBU) 400 MG tablet Take 1 tablet by mouth every 6 hours as needed for Pain, Disp-120 tablet, R-3Normal           CRISTINA Dominguez CNP    (Please note that portions of this note were completed with a voice recognition program. Efforts were made to edit the dictations but occasionally words are mis-transcribed.)            Miguel Guillaume APRN - CNP  06/10/24 1367

## 2024-06-10 NOTE — ED NOTES
Pt to [facility] with c/o left groin, testicle and leg pain. Pt reports that they've seen urology for the testicle pain and has had several imaging done. Pt reports getting gabapentin for pain. HE reports symptoms have been unchanged for several months. Pt WILFREDO.      Marion Veronica, RN  06/10/24 1861

## 2024-08-11 ENCOUNTER — HOSPITAL ENCOUNTER (EMERGENCY)
Age: 39
Discharge: HOME | End: 2024-08-11
Attending: STUDENT IN AN ORGANIZED HEALTH CARE EDUCATION/TRAINING PROGRAM

## 2024-08-11 VITALS
OXYGEN SATURATION: 96 % | BODY MASS INDEX: 32.93 KG/M2 | TEMPERATURE: 99 F | HEIGHT: 70 IN | RESPIRATION RATE: 18 BRPM | SYSTOLIC BLOOD PRESSURE: 122 MMHG | DIASTOLIC BLOOD PRESSURE: 80 MMHG | WEIGHT: 230 LBS | HEART RATE: 63 BPM

## 2024-08-11 DIAGNOSIS — S61.411A LACERATION OF RIGHT HAND WITHOUT FOREIGN BODY, INITIAL ENCOUNTER: Primary | ICD-10-CM

## 2024-08-11 PROCEDURE — 12001 RPR S/N/AX/GEN/TRNK 2.5CM/<: CPT

## 2024-08-11 PROCEDURE — 99282 EMERGENCY DEPT VISIT SF MDM: CPT

## 2024-08-11 RX ORDER — PROPRANOLOL HYDROCHLORIDE 40 MG/1
40 TABLET ORAL 2 TIMES DAILY
COMMUNITY

## 2024-08-11 RX ORDER — HYDROXYZINE HYDROCHLORIDE 25 MG/1
25 TABLET, FILM COATED ORAL 3 TIMES DAILY
COMMUNITY

## 2024-08-11 ASSESSMENT — PAIN SCALES - GENERAL: PAINLEVEL_OUTOF10: 2

## 2024-08-11 ASSESSMENT — PAIN - FUNCTIONAL ASSESSMENT: PAIN_FUNCTIONAL_ASSESSMENT: 0-10

## 2024-08-11 NOTE — ED NOTES
Pt ambulated from ED lobby with CC right thumb finger laceration 30 PTA. Pt states he was washing dishes and cut it on a cup. Patient resting in chair. Respirations easy and unlabored. No distress noted. Call light within reach.

## 2024-08-11 NOTE — DISCHARGE INSTRUCTIONS
You were seen at [facility] emergency department for a laceration on your right hand.  The laceration was cleaned and repaired with 5 sutures that should be removed in 1 week.  Please do not submerge your hand in water of any kind, you may wash with soap and water several times per day, apply antibiotic ointment 3 times per day if you have it at home.  If no signs of infection such as swelling, redness, warmth, pus drainage, please return the emergency department.

## 2024-12-06 ENCOUNTER — OFFICE VISIT (OUTPATIENT)
Dept: FAMILY MEDICINE CLINIC | Age: 39
End: 2024-12-06

## 2024-12-06 VITALS
DIASTOLIC BLOOD PRESSURE: 80 MMHG | HEART RATE: 68 BPM | HEIGHT: 71 IN | BODY MASS INDEX: 31.56 KG/M2 | WEIGHT: 225.4 LBS | SYSTOLIC BLOOD PRESSURE: 122 MMHG | TEMPERATURE: 97.9 F | RESPIRATION RATE: 16 BRPM

## 2024-12-06 DIAGNOSIS — N50.812 LEFT TESTICULAR PAIN: ICD-10-CM

## 2024-12-06 DIAGNOSIS — R20.2 PARESTHESIA OF LEFT LEG: Primary | ICD-10-CM

## 2024-12-06 DIAGNOSIS — F43.10 PTSD (POST-TRAUMATIC STRESS DISORDER): ICD-10-CM

## 2024-12-06 DIAGNOSIS — Z13.1 DIABETES MELLITUS SCREENING: ICD-10-CM

## 2024-12-06 DIAGNOSIS — Z11.59 NEED FOR HEPATITIS C SCREENING TEST: ICD-10-CM

## 2024-12-06 DIAGNOSIS — Z13.220 LIPID SCREENING: ICD-10-CM

## 2024-12-06 DIAGNOSIS — F31.62 BIPOLAR DISORDER, CURRENT EPISODE MIXED, MODERATE (HCC): ICD-10-CM

## 2024-12-06 DIAGNOSIS — Z11.4 ENCOUNTER FOR SCREENING FOR HIV: ICD-10-CM

## 2024-12-06 DIAGNOSIS — Z13.31 POSITIVE DEPRESSION SCREENING: ICD-10-CM

## 2024-12-06 RX ORDER — TRAZODONE HYDROCHLORIDE 50 MG/1
TABLET, FILM COATED ORAL
COMMUNITY
Start: 2024-12-05

## 2024-12-06 SDOH — ECONOMIC STABILITY: FOOD INSECURITY: WITHIN THE PAST 12 MONTHS, THE FOOD YOU BOUGHT JUST DIDN'T LAST AND YOU DIDN'T HAVE MONEY TO GET MORE.: OFTEN TRUE

## 2024-12-06 SDOH — ECONOMIC STABILITY: FOOD INSECURITY: WITHIN THE PAST 12 MONTHS, YOU WORRIED THAT YOUR FOOD WOULD RUN OUT BEFORE YOU GOT MONEY TO BUY MORE.: OFTEN TRUE

## 2024-12-06 SDOH — ECONOMIC STABILITY: INCOME INSECURITY: HOW HARD IS IT FOR YOU TO PAY FOR THE VERY BASICS LIKE FOOD, HOUSING, MEDICAL CARE, AND HEATING?: HARD

## 2024-12-06 ASSESSMENT — PATIENT HEALTH QUESTIONNAIRE - PHQ9
SUM OF ALL RESPONSES TO PHQ QUESTIONS 1-9: 17
1. LITTLE INTEREST OR PLEASURE IN DOING THINGS: MORE THAN HALF THE DAYS
9. THOUGHTS THAT YOU WOULD BE BETTER OFF DEAD, OR OF HURTING YOURSELF: SEVERAL DAYS
3. TROUBLE FALLING OR STAYING ASLEEP: MORE THAN HALF THE DAYS
5. POOR APPETITE OR OVEREATING: NEARLY EVERY DAY
SUM OF ALL RESPONSES TO PHQ QUESTIONS 1-9: 17
10. IF YOU CHECKED OFF ANY PROBLEMS, HOW DIFFICULT HAVE THESE PROBLEMS MADE IT FOR YOU TO DO YOUR WORK, TAKE CARE OF THINGS AT HOME, OR GET ALONG WITH OTHER PEOPLE: VERY DIFFICULT
4. FEELING TIRED OR HAVING LITTLE ENERGY: SEVERAL DAYS
2. FEELING DOWN, DEPRESSED OR HOPELESS: MORE THAN HALF THE DAYS
SUM OF ALL RESPONSES TO PHQ QUESTIONS 1-9: 17
SUM OF ALL RESPONSES TO PHQ QUESTIONS 1-9: 16
SUM OF ALL RESPONSES TO PHQ9 QUESTIONS 1 & 2: 4
7. TROUBLE CONCENTRATING ON THINGS, SUCH AS READING THE NEWSPAPER OR WATCHING TELEVISION: MORE THAN HALF THE DAYS
8. MOVING OR SPEAKING SO SLOWLY THAT OTHER PEOPLE COULD HAVE NOTICED. OR THE OPPOSITE, BEING SO FIGETY OR RESTLESS THAT YOU HAVE BEEN MOVING AROUND A LOT MORE THAN USUAL: NEARLY EVERY DAY
6. FEELING BAD ABOUT YOURSELF - OR THAT YOU ARE A FAILURE OR HAVE LET YOURSELF OR YOUR FAMILY DOWN: SEVERAL DAYS

## 2024-12-06 ASSESSMENT — COLUMBIA-SUICIDE SEVERITY RATING SCALE - C-SSRS
2. HAVE YOU ACTUALLY HAD ANY THOUGHTS OF KILLING YOURSELF?: NO
1. WITHIN THE PAST MONTH, HAVE YOU WISHED YOU WERE DEAD OR WISHED YOU COULD GO TO SLEEP AND NOT WAKE UP?: YES
6. HAVE YOU EVER DONE ANYTHING, STARTED TO DO ANYTHING, OR PREPARED TO DO ANYTHING TO END YOUR LIFE?: NO

## 2024-12-06 ASSESSMENT — ENCOUNTER SYMPTOMS
CONSTIPATION: 0
BLOOD IN STOOL: 0
SHORTNESS OF BREATH: 0
ABDOMINAL PAIN: 0
ANAL BLEEDING: 0
BACK PAIN: 1
COUGH: 0
NAUSEA: 0
VOMITING: 0
DIARRHEA: 0

## 2024-12-06 NOTE — PATIENT INSTRUCTIONS
a RTA card $0.50, Medicare $0.50, Youth 6 to 18 years old $0.75, under 6 free.   Phone Number: [phone]  Website: https://www.[employer] transit:  What they offer: Transportation throughout the state of [place] and [place]. Available for Ambulatory and wheelchair bound services.  Cost: [place] Medicaid and Private pay   Phone Number: [phone]  Website: https://[employer]    Veter[employer]:  What they offer: Transportation for [facility] Veterans only.   Phone Number: [phone]  Website: https://www.[employer]

## 2024-12-06 NOTE — PROGRESS NOTES
Health Maintenance Due   Topic Date Due    Pneumococcal 0-64 years Vaccine (1 of 2 - PCV) Never done    Depression Monitoring  DONE    Varicella vaccine (1 of 2 - 13+ 2-dose series) Never done    DTaP/Tdap/Td vaccine (2 - Tdap) 11/30/2001    HIV screen  Never done    Hepatitis C screen  Never done    Hepatitis B vaccine (1 of 3 - 19+ 3-dose series) Never done    Diabetes screen  Never done    Flu vaccine (1) Never done    COVID-19 Vaccine (1 - 2023-24 season) Never done       
and sleep disturbance. Negative for self-injury and suicidal ideas. The patient is nervous/anxious.        Physical Exam  Vitals and nursing note reviewed.   Constitutional:       General: He is not in acute distress.     Appearance: Normal appearance. He is obese.   HENT:      Head: Normocephalic and atraumatic.      Right Ear: Tympanic membrane, ear canal and external ear normal.      Left Ear: Tympanic membrane, ear canal and external ear normal.      Nose: Nose normal. No congestion.      Mouth/Throat:      Mouth: Mucous membranes are moist.      Pharynx: No posterior oropharyngeal erythema.   Eyes:      Extraocular Movements: Extraocular movements intact.      Conjunctiva/sclera: Conjunctivae normal.      Pupils: Pupils are equal, round, and reactive to light.   Cardiovascular:      Rate and Rhythm: Normal rate and regular rhythm.      Pulses: Normal pulses.      Heart sounds: No murmur heard.  Pulmonary:      Effort: Pulmonary effort is normal. No respiratory distress.   Abdominal:      General: Bowel sounds are normal. There is no distension.      Palpations: Abdomen is soft. There is no mass.      Tenderness: There is no abdominal tenderness.   Musculoskeletal:      Cervical back: Neck supple.      Lumbar back: Tenderness present. No spasms or bony tenderness. Normal range of motion. Positive left straight leg raise test. Negative right straight leg raise test.        Back:       Right lower leg: No edema.      Left lower leg: No edema.   Skin:     General: Skin is warm.   Neurological:      General: No focal deficit present.      Mental Status: He is alert and oriented to person, place, and time.   Psychiatric:         Mood and Affect: Mood normal. Affect is labile.         Behavior: Behavior normal.             There is no immunization history for the selected administration types on file for this patient.      Assessment / Plan:   1. Paresthesia of left leg  2. Left testicular pain  Chronic issues and

## 2024-12-16 ENCOUNTER — HOSPITAL ENCOUNTER (OUTPATIENT)
Age: 39
Discharge: HOME | End: 2024-12-16
Payer: COMMERCIAL

## 2024-12-16 ENCOUNTER — HOSPITAL ENCOUNTER (OUTPATIENT)
Dept: GENERAL RADIOLOGY | Age: 39
Discharge: HOME | End: 2024-12-16
Payer: COMMERCIAL

## 2024-12-16 DIAGNOSIS — R20.2 PARESTHESIA OF LEFT LEG: ICD-10-CM

## 2024-12-16 DIAGNOSIS — Z11.4 ENCOUNTER FOR SCREENING FOR HIV: ICD-10-CM

## 2024-12-16 DIAGNOSIS — N50.812 LEFT TESTICULAR PAIN: ICD-10-CM

## 2024-12-16 DIAGNOSIS — F31.62 BIPOLAR DISORDER, CURRENT EPISODE MIXED, MODERATE (HCC): ICD-10-CM

## 2024-12-16 DIAGNOSIS — Z13.1 DIABETES MELLITUS SCREENING: ICD-10-CM

## 2024-12-16 DIAGNOSIS — Z11.59 NEED FOR HEPATITIS C SCREENING TEST: ICD-10-CM

## 2024-12-16 DIAGNOSIS — Z13.220 LIPID SCREENING: ICD-10-CM

## 2024-12-16 LAB
ALBUMIN SERPL BCG-MCNC: 4.2 G/DL (ref 3.5–5.1)
ALP SERPL-CCNC: 90 U/L (ref 38–126)
ALT SERPL W/O P-5'-P-CCNC: 13 U/L (ref 11–66)
ANION GAP SERPL CALC-SCNC: 9 MEQ/L (ref 8–16)
AST SERPL-CCNC: 15 U/L (ref 5–40)
BASOPHILS ABSOLUTE: 0 THOU/MM3 (ref 0–0.1)
BASOPHILS NFR BLD AUTO: 0.4 %
BILIRUB SERPL-MCNC: 0.5 MG/DL (ref 0.3–1.2)
BUN SERPL-MCNC: 13 MG/DL (ref 7–22)
CALCIUM SERPL-MCNC: 9.5 MG/DL (ref 8.5–10.5)
CHLORIDE SERPL-SCNC: 101 MEQ/L (ref 98–111)
CHOLEST SERPL-MCNC: 210 MG/DL (ref 100–199)
CO2 SERPL-SCNC: 28 MEQ/L (ref 23–33)
CREAT SERPL-MCNC: 0.9 MG/DL (ref 0.4–1.2)
DEPRECATED MEAN GLUCOSE BLD GHB EST-ACNC: 66 MG/DL (ref 70–126)
DEPRECATED RDW RBC AUTO: 45 FL (ref 35–45)
EOSINOPHIL NFR BLD AUTO: 3.6 %
EOSINOPHILS ABSOLUTE: 0.3 THOU/MM3 (ref 0–0.4)
ERYTHROCYTE [DISTWIDTH] IN BLOOD BY AUTOMATED COUNT: 12.7 % (ref 11.5–14.5)
FERRITIN SERPL IA-MCNC: 310 NG/ML (ref 22–322)
FOLATE SERPL-MCNC: 7.4 NG/ML (ref 4.8–24.2)
GFR SERPL CREATININE-BSD FRML MDRD: > 90 ML/MIN/1.73M2
GLUCOSE SERPL-MCNC: 87 MG/DL (ref 70–108)
HBA1C MFR BLD HPLC: 4.2 % (ref 4.4–6.4)
HCT VFR BLD AUTO: 39.8 % (ref 42–52)
HCV IGG SERPL QL IA: NEGATIVE
HDLC SERPL-MCNC: 40 MG/DL
HGB BLD-MCNC: 12.9 GM/DL (ref 14–18)
HIV 1+2 AB+HIV1 P24 AG SERPL QL IA: NORMAL
IMM GRANULOCYTES # BLD AUTO: 0.01 THOU/MM3 (ref 0–0.07)
IMM GRANULOCYTES NFR BLD AUTO: 0.1 %
IRON SERPL-MCNC: 56 UG/DL (ref 65–195)
LDLC SERPL CALC-MCNC: 141 MG/DL
LYMPHOCYTES ABSOLUTE: 2.2 THOU/MM3 (ref 1–4.8)
LYMPHOCYTES NFR BLD AUTO: 24.3 %
MAGNESIUM SERPL-MCNC: 1.7 MG/DL (ref 1.6–2.4)
MCH RBC QN AUTO: 31.2 PG (ref 26–33)
MCHC RBC AUTO-ENTMCNC: 32.4 GM/DL (ref 32.2–35.5)
MCV RBC AUTO: 96.1 FL (ref 80–94)
MONOCYTES ABSOLUTE: 0.7 THOU/MM3 (ref 0.4–1.3)
MONOCYTES NFR BLD AUTO: 7.5 %
NEUTROPHILS ABSOLUTE: 5.9 THOU/MM3 (ref 1.8–7.7)
NEUTROPHILS NFR BLD AUTO: 64.1 %
NRBC BLD AUTO-RTO: 0 /100 WBC
PLATELET # BLD AUTO: 278 THOU/MM3 (ref 130–400)
PMV BLD AUTO: 10.8 FL (ref 9.4–12.4)
POTASSIUM SERPL-SCNC: 4.4 MEQ/L (ref 3.5–5.2)
PROT SERPL-MCNC: 6.8 G/DL (ref 6.1–8)
RBC # BLD AUTO: 4.14 MILL/MM3 (ref 4.7–6.1)
SODIUM SERPL-SCNC: 138 MEQ/L (ref 135–145)
TIBC SERPL-MCNC: 267 UG/DL (ref 171–450)
TRIGL SERPL-MCNC: 144 MG/DL (ref 0–199)
TSH SERPL DL<=0.005 MIU/L-ACNC: 3.42 UIU/ML (ref 0.4–4.2)
VIT B12 SERPL-MCNC: 311 PG/ML (ref 211–911)
WBC # BLD AUTO: 9.2 THOU/MM3 (ref 4.8–10.8)

## 2024-12-16 PROCEDURE — 84443 ASSAY THYROID STIM HORMONE: CPT

## 2024-12-16 PROCEDURE — 86803 HEPATITIS C AB TEST: CPT

## 2024-12-16 PROCEDURE — 83036 HEMOGLOBIN GLYCOSYLATED A1C: CPT

## 2024-12-16 PROCEDURE — 82728 ASSAY OF FERRITIN: CPT

## 2024-12-16 PROCEDURE — 82746 ASSAY OF FOLIC ACID SERUM: CPT

## 2024-12-16 PROCEDURE — 87389 HIV-1 AG W/HIV-1&-2 AB AG IA: CPT

## 2024-12-16 PROCEDURE — 36415 COLL VENOUS BLD VENIPUNCTURE: CPT

## 2024-12-16 PROCEDURE — 85025 COMPLETE CBC W/AUTO DIFF WBC: CPT

## 2024-12-16 PROCEDURE — 82607 VITAMIN B-12: CPT

## 2024-12-16 PROCEDURE — 80053 COMPREHEN METABOLIC PANEL: CPT

## 2024-12-16 PROCEDURE — 83550 IRON BINDING TEST: CPT

## 2024-12-16 PROCEDURE — 80061 LIPID PANEL: CPT

## 2024-12-16 PROCEDURE — 83735 ASSAY OF MAGNESIUM: CPT

## 2024-12-16 PROCEDURE — 72100 X-RAY EXAM L-S SPINE 2/3 VWS: CPT

## 2024-12-16 PROCEDURE — 83540 ASSAY OF IRON: CPT

## 2024-12-17 ENCOUNTER — TELEPHONE (OUTPATIENT)
Dept: FAMILY MEDICINE CLINIC | Age: 39
End: 2024-12-17

## 2024-12-17 DIAGNOSIS — E61.1 IRON DEFICIENCY: Primary | ICD-10-CM

## 2024-12-17 RX ORDER — FERROUS SULFATE 325(65) MG
325 TABLET ORAL
Qty: 90 TABLET | Refills: 1 | Status: SHIPPED | OUTPATIENT
Start: 2024-12-17

## 2024-12-17 NOTE — TELEPHONE ENCOUNTER
----- Message from Dr. Maryam Lombardo MD sent at 12/17/2024  8:05 AM EST -----  Please let patient know labs show slightly elevated cholesterol, recommend limiting high cholesterol foods.  Iron is slightly low, can trial iron supplementation daily, as this may be worsening tingling in his leg.  Otherwise, labs overall within normal limits.  Plan to follow-up as scheduled.

## 2024-12-17 NOTE — TELEPHONE ENCOUNTER
Patient notified of results.  He is agreeable to iron supplementation but states that he does not have any extra money at this time to pay for OTC supplements. Would like to know if you would write this as a prescription. If so, send to [facility].  No need to call back unless further issue.

## 2024-12-17 NOTE — TELEPHONE ENCOUNTER
----- Message from Dr. Maryam Lombardo MD sent at 12/17/2024  8:06 AM EST -----  Please let patient know x-ray shows no definite fracture or abnormality, would recommend patient follow-up with physical therapy and [facility] as referred last visit.  Thanks!

## 2024-12-19 ENCOUNTER — HOSPITAL ENCOUNTER (OUTPATIENT)
Dept: PHYSICAL THERAPY | Age: 39
Setting detail: THERAPIES SERIES
Discharge: HOME | End: 2024-12-19
Payer: COMMERCIAL

## 2024-12-19 PROCEDURE — 97110 THERAPEUTIC EXERCISES: CPT

## 2024-12-19 PROCEDURE — 97161 PT EVAL LOW COMPLEX 20 MIN: CPT

## 2024-12-19 NOTE — PROGRESS NOTES
demonstrated understanding of education provided.  []  Patient unable to verbalize and/or demonstrate understanding of education provided.  Will continue education.  []  Barriers to learning:     PLAN:  Treatment Recommendations: Strengthening, Range of Motion, Balance Training, Neuromuscular Re-education, Manual Therapy - Soft Tissue Mobilization, Manual Therapy - Joint Manipulation, Pain Management, Home Exercise Program, Patient Education, Integrative Dry Needling, Aquatics, Modalities, and Neuropathy Management    [x]  Plan of care initiated.  Plan to see patient 2 times per week for 12 weeks to address the treatment planned outlined above.  []  Continue with current plan of care  []  Modify plan of care as follows:    []  Hold pending physician visit  []  Discharge    Time In 1345   Time Out 1445   Timed Code Minutes: 15 min   Total Treatment Time: 60 min       Electronically Signed by: Deborah Almonte, PT

## 2025-01-06 ENCOUNTER — HOSPITAL ENCOUNTER (OUTPATIENT)
Dept: PHYSICAL THERAPY | Age: 40
Setting detail: THERAPIES SERIES
Discharge: HOME | End: 2025-01-06
Payer: COMMERCIAL

## 2025-01-06 PROCEDURE — 97110 THERAPEUTIC EXERCISES: CPT

## 2025-01-06 PROCEDURE — 97140 MANUAL THERAPY 1/> REGIONS: CPT

## 2025-01-06 NOTE — PROGRESS NOTES
0-120 deg hip flexion during HOMERO, negative hip scour test bilaterally in order to bend and lift without straining lumbar spine.    Patient will demonstrate good abdominal bracing and body mechanics during therapy session in order to preserve neutral spine during household tasks.    Patient will report ability to stand and walk around his home longer than 30 minutes in order to prepare meals and perform household tasks for his kids.     Patient will be IND with HEP in order to meet long term goals.    Long Term Goals:  Time Frame: 12 weeks    Patient will improve score of Modified Oswestry LBP questionnaire from baseline 28/50 to less than 16/50 in order to perform household tasks, care for his young children, and return to work.    Patient will squat to lift 20 lb weight from floor to standing with good body mechanics in order to preserve neutral spine and avoid injury during household tasks like laundry, vacuuming.     Patient will improve BLE strength to 4+/5 all directions SLR, knee extension, knee flexion, ankle DF in order to squat, lunge, and navigate steps without difficulty.       Patient Education:   [x]  HEP/Education Completed: Plan of Care, Goals, Discussed meralgia paresthetica vs lumbar source of nerve impingement. Issued Left shoe heel lift, instructed on use, advised to monitor his balance/ safety at the ankle. HEP handout issued for hip mobility, lumbar extensions  [employer] Access Code: [email or web address]   []  No new Education completed  []  Reviewed Prior HEP      [x]  Patient verbalized and/or demonstrated understanding of education provided.  []  Patient unable to verbalize and/or demonstrate understanding of education provided.  Will continue education.  []  Barriers to learning:     PLAN:  Treatment Recommendations: Strengthening, Range of Motion, Balance Training, Neuromuscular Re-education, Manual Therapy - Soft Tissue Mobilization, Manual Therapy - Joint Manipulation, Pain Management, Home

## 2025-01-09 ENCOUNTER — HOSPITAL ENCOUNTER (OUTPATIENT)
Dept: PHYSICAL THERAPY | Age: 40
Setting detail: THERAPIES SERIES
Discharge: HOME | End: 2025-01-09
Payer: COMMERCIAL

## 2025-01-09 PROCEDURE — 97110 THERAPEUTIC EXERCISES: CPT

## 2025-01-09 NOTE — PROGRESS NOTES
Manual Therapy - Soft Tissue Mobilization, Manual Therapy - Joint Manipulation, Pain Management, Home Exercise Program, Patient Education, Integrative Dry Needling, Aquatics, Modalities, and Neuropathy Management    []  Plan of care initiated.  Plan to see patient 2 times per week for 12 weeks to address the treatment planned outlined above.  [x]  Continue with current plan of care  []  Modify plan of care as follows:    []  Hold pending physician visit  []  Discharge    Time In 1445   Time Out 1530   Timed Code Minutes: 45   Total Treatment Time: 45       Electronically Signed by: Deborah Almonte, PT

## 2025-01-13 ENCOUNTER — HOSPITAL ENCOUNTER (OUTPATIENT)
Dept: PHYSICAL THERAPY | Age: 40
Setting detail: THERAPIES SERIES
Discharge: HOME | End: 2025-01-13
Payer: COMMERCIAL

## 2025-01-13 PROCEDURE — 97110 THERAPEUTIC EXERCISES: CPT

## 2025-01-13 NOTE — PROGRESS NOTES
[facility]  PHYSICAL THERAPY  [] EVALUATION  [x] DAILY NOTE (LAND) [] DAILY NOTE (AQUATIC ) [] PROGRESS NOTE [] DISCHARGE NOTE    [x] OUTPATIENT REHABILITATION CENTER [facility]   [] [facility] CARE [place]    [] [facility]   [] [facility]    Date: 2025  Patient Name:  Carlos Chang  : 1985  MRN: [id number]  CSN: [id number]    Referring Practitioner Maryam Lombardo MD [id number]      Diagnosis  Diagnoses       R20.2 (ICD-10-CM) - Paresthesia of skin           Treatment Diagnosis M54.59, G89.29  Chronic Lower Back Pain  M79.604  Pain in right leg  M79.605  Pain in left leg  M62.81 Generalized Muscle Weakness  R20.9 Unspecified Disturbance of Skin Sensation  M25.60 Stiffness of Unspecified Joint   Date of Evaluation 24   Additional Pertinent History Carlos Chang has a past medical history of Anxiety, Bipolar disorder ([facility]), PTSD (post-traumatic stress disorder), and Substance abuse ([facility]).  he has a past surgical history that includes knee surgery and back surgery.     Allergies Allergies   Allergen Reactions    Molds & Smuts     Mushroom Extract Complex (Do Not Select) Swelling      Medications   Current Outpatient Medications:     ferrous sulfate (IRON 325) 325 (65 Fe) MG tablet, Take 1 tablet by mouth daily (with breakfast), Disp: 90 tablet, Rfl: 1    traZODone (DESYREL) 50 MG tablet, , Disp: , Rfl:     hydrOXYzine HCl (ATARAX) 25 MG tablet, Take 1 tablet by mouth 3 times daily, Disp: , Rfl:     ibuprofen (IBU) 400 MG tablet, Take 1 tablet by mouth every 6 hours as needed for Pain, Disp: 120 tablet, Rfl: 3    gabapentin (NEURONTIN) 300 MG capsule, Take 1 capsule by mouth 2 times daily for 30 days. Intended supply: 30 days, Disp: 60 capsule, Rfl: 4    loratadine-pseudoephedrine (CLARITIN-D 12HR) 5-120 MG per extended release tablet, Take 1 tablet by mouth 2 times daily (Patient taking differently: Take 1 tablet by mouth as needed), Disp: 30

## 2025-01-14 ENCOUNTER — TRANSCRIBE ORDERS (OUTPATIENT)
Dept: ADMINISTRATIVE | Age: 40
End: 2025-01-14

## 2025-01-14 DIAGNOSIS — M54.2 CERVICALGIA: Primary | ICD-10-CM

## 2025-01-15 ENCOUNTER — OFFICE VISIT (OUTPATIENT)
Dept: FAMILY MEDICINE CLINIC | Age: 40
End: 2025-01-15

## 2025-01-15 VITALS
HEIGHT: 71 IN | WEIGHT: 226.6 LBS | HEART RATE: 60 BPM | BODY MASS INDEX: 31.72 KG/M2 | SYSTOLIC BLOOD PRESSURE: 130 MMHG | TEMPERATURE: 97.8 F | DIASTOLIC BLOOD PRESSURE: 82 MMHG | RESPIRATION RATE: 16 BRPM

## 2025-01-15 DIAGNOSIS — N50.812 LEFT TESTICULAR PAIN: ICD-10-CM

## 2025-01-15 DIAGNOSIS — R20.2 PARESTHESIA OF LEFT LEG: ICD-10-CM

## 2025-01-15 DIAGNOSIS — F41.9 ANXIETY: ICD-10-CM

## 2025-01-15 DIAGNOSIS — F43.10 PTSD (POST-TRAUMATIC STRESS DISORDER): ICD-10-CM

## 2025-01-15 DIAGNOSIS — F31.62 BIPOLAR DISORDER, CURRENT EPISODE MIXED, MODERATE (HCC): Primary | ICD-10-CM

## 2025-01-15 RX ORDER — HYDROXYZINE PAMOATE 25 MG/1
CAPSULE ORAL
COMMUNITY
Start: 2024-12-05

## 2025-01-15 RX ORDER — CYCLOBENZAPRINE HCL 10 MG
10 TABLET ORAL 3 TIMES DAILY PRN
COMMUNITY

## 2025-01-15 RX ORDER — TRAMADOL HYDROCHLORIDE 50 MG/1
50 TABLET ORAL EVERY 6 HOURS PRN
COMMUNITY

## 2025-01-15 SDOH — ECONOMIC STABILITY: FOOD INSECURITY: WITHIN THE PAST 12 MONTHS, YOU WORRIED THAT YOUR FOOD WOULD RUN OUT BEFORE YOU GOT MONEY TO BUY MORE.: NEVER TRUE

## 2025-01-15 SDOH — ECONOMIC STABILITY: FOOD INSECURITY: WITHIN THE PAST 12 MONTHS, THE FOOD YOU BOUGHT JUST DIDN'T LAST AND YOU DIDN'T HAVE MONEY TO GET MORE.: NEVER TRUE

## 2025-01-15 ASSESSMENT — PATIENT HEALTH QUESTIONNAIRE - PHQ9
SUM OF ALL RESPONSES TO PHQ QUESTIONS 1-9: 10
5. POOR APPETITE OR OVEREATING: SEVERAL DAYS
SUM OF ALL RESPONSES TO PHQ QUESTIONS 1-9: 10
1. LITTLE INTEREST OR PLEASURE IN DOING THINGS: SEVERAL DAYS
9. THOUGHTS THAT YOU WOULD BE BETTER OFF DEAD, OR OF HURTING YOURSELF: NOT AT ALL
3. TROUBLE FALLING OR STAYING ASLEEP: MORE THAN HALF THE DAYS
6. FEELING BAD ABOUT YOURSELF - OR THAT YOU ARE A FAILURE OR HAVE LET YOURSELF OR YOUR FAMILY DOWN: SEVERAL DAYS
10. IF YOU CHECKED OFF ANY PROBLEMS, HOW DIFFICULT HAVE THESE PROBLEMS MADE IT FOR YOU TO DO YOUR WORK, TAKE CARE OF THINGS AT HOME, OR GET ALONG WITH OTHER PEOPLE: SOMEWHAT DIFFICULT
8. MOVING OR SPEAKING SO SLOWLY THAT OTHER PEOPLE COULD HAVE NOTICED. OR THE OPPOSITE, BEING SO FIGETY OR RESTLESS THAT YOU HAVE BEEN MOVING AROUND A LOT MORE THAN USUAL: SEVERAL DAYS
SUM OF ALL RESPONSES TO PHQ9 QUESTIONS 1 & 2: 2
SUM OF ALL RESPONSES TO PHQ QUESTIONS 1-9: 10
2. FEELING DOWN, DEPRESSED OR HOPELESS: SEVERAL DAYS
SUM OF ALL RESPONSES TO PHQ QUESTIONS 1-9: 10
7. TROUBLE CONCENTRATING ON THINGS, SUCH AS READING THE NEWSPAPER OR WATCHING TELEVISION: MORE THAN HALF THE DAYS
4. FEELING TIRED OR HAVING LITTLE ENERGY: SEVERAL DAYS

## 2025-01-15 ASSESSMENT — ENCOUNTER SYMPTOMS
NAUSEA: 0
COUGH: 0
DIARRHEA: 0
SHORTNESS OF BREATH: 0
CONSTIPATION: 0
BACK PAIN: 1
ABDOMINAL PAIN: 0
VOMITING: 0

## 2025-01-15 NOTE — PROGRESS NOTES
Health Maintenance Due   Topic Date Due    Varicella vaccine (1 of 2 - 13+ 2-dose series) Never done    DTaP/Tdap/Td vaccine (2 - Tdap) 11/30/2001    Hepatitis B vaccine (1 of 3 - 19+ 3-dose series) Never done    Flu vaccine (1) Never done    COVID-19 Vaccine (1 - 2023-24 season) Never done       
  Neurological:      General: No focal deficit present.      Mental Status: He is alert and oriented to person, place, and time.   Psychiatric:         Mood and Affect: Mood normal. Affect is labile.         Behavior: Behavior normal.      Comments: Mildly grandiose thought content              Immunization History   Administered Date(s) Administered    Td vaccine (adult) 11/29/2001         Assessment / Plan:   1. Bipolar disorder, current episode mixed, moderate (HCC)  2. Anxiety  3. PTSD (post-traumatic stress disorder)  All chronic conditions, overall stable and improved from prior. Depression screening positive, but on discussion, patient feels improved compared to prior. He did stop Lamictal. Continue current dose Trazodone, prazosin, hydroxyzine as prescribed. Follow-up with [facility] as scheduled for medication management    4. Paresthesia of left leg  5. Left testicular pain  Chronic, uncontrolled but improving per report. Recommend follow-up as scheduled for PT. Continue current dose gabapentin 300 mg BID. Continue flexeril and tramadol as prescribed by [facility]. Although, per PDMP review, there are no recent prescriptions of tramadol listed. Discussed this is not a long term pain management option.   -Follow-up for MRI of cervical and lumbar spine as planned  -Could consider increasing gabapentin dose in future if no improvement           Return in about 3 months (around 4/15/2025).          Medications Prescribed:  No orders of the defined types were placed in this encounter.      Future Appointments   Date Time Provider Department Center   1/16/2025  2:30 PM Deborah Almonte PT [facility]   1/20/2025  2:30 PM Deborah Almonte, PT [facility]   1/23/2025  2:30 PM Deborah Almonte PT [facility]   4/14/2025 11:40 AM Maryam Lombardo MD [facility]       Patient given educational materials - see patient instructions.  Discussed use, benefit, and sideeffects of prescribed

## 2025-01-16 ENCOUNTER — HOSPITAL ENCOUNTER (OUTPATIENT)
Dept: PHYSICAL THERAPY | Age: 40
Setting detail: THERAPIES SERIES
Discharge: HOME | End: 2025-01-16
Payer: COMMERCIAL

## 2025-01-16 PROCEDURE — 97140 MANUAL THERAPY 1/> REGIONS: CPT

## 2025-01-16 PROCEDURE — 97110 THERAPEUTIC EXERCISES: CPT

## 2025-01-16 NOTE — PROGRESS NOTES
leg distraction for both left and right legs - finished with most of his pain in the right hip flexor/psoas region. Educated patient on psoas release, responded well to hip flexor stretching today. Patient felt like the stretching today was very intense, advised to monitor pain after session today.      Body Structures/Functions/Activity Limitations: impaired ROM, impaired sensation, impaired strength, pain, abnormal gait, and abnormal posture  Prognosis: good     GOALS:  Patient Goal: Decrease back and leg pain, improve numbness and strength of the legs    Short Term Goals:  Time Frame: 6 weeks    Patient will report decreased lower back pain and LLE groin pain from baseline 8/10 to less than 4/10 during prolonged sitting in order to travel and perform toileting tasks without producing leg numbness.    Patient will improve BLE PROM to 0-90 deg hamstring SLR, 0-120 deg hip flexion during HOMERO, negative hip scour test bilaterally in order to bend and lift without straining lumbar spine.    Patient will demonstrate good abdominal bracing and body mechanics during therapy session in order to preserve neutral spine during household tasks.    Patient will report ability to stand and walk around his home longer than 30 minutes in order to prepare meals and perform household tasks for his kids.     Patient will be IND with HEP in order to meet long term goals.    Long Term Goals:  Time Frame: 12 weeks    Patient will improve score of Modified Oswestry LBP questionnaire from baseline 28/50 to less than 16/50 in order to perform household tasks, care for his young children, and return to work.    Patient will squat to lift 20 lb weight from floor to standing with good body mechanics in order to preserve neutral spine and avoid injury during household tasks like laundry, vacuuming.     Patient will improve BLE strength to 4+/5 all directions SLR, knee extension, knee flexion, ankle DF in order to squat, lunge, and navigate

## 2025-01-20 ENCOUNTER — HOSPITAL ENCOUNTER (OUTPATIENT)
Dept: PHYSICAL THERAPY | Age: 40
Setting detail: THERAPIES SERIES
Discharge: HOME | End: 2025-01-20
Payer: COMMERCIAL

## 2025-01-20 PROCEDURE — 97110 THERAPEUTIC EXERCISES: CPT

## 2025-01-20 NOTE — PROGRESS NOTES
[facility]  PHYSICAL THERAPY  [] EVALUATION  [x] DAILY NOTE (LAND) [] DAILY NOTE (AQUATIC ) [] PROGRESS NOTE [] DISCHARGE NOTE    [x] OUTPATIENT REHABILITATION CENTER [facility]   [] [facility] [place]    [] [facility]   [] [facility]    Date: 2025  Patient Name:  Carlos Chang  : 1985  MRN: [id number]  CSN: [id number]    Referring Practitioner Maryam Lombardo MD [id number]      Diagnosis  Diagnoses       R20.2 (ICD-10-CM) - Paresthesia of skin           Treatment Diagnosis M54.59, G89.29  Chronic Lower Back Pain  M79.604  Pain in right leg  M79.605  Pain in left leg  M62.81 Generalized Muscle Weakness  R20.9 Unspecified Disturbance of Skin Sensation  M25.60 Stiffness of Unspecified Joint   Date of Evaluation 24   Additional Pertinent History Carlos Chang has a past medical history of Anxiety, Bipolar disorder ([facility]), PTSD (post-traumatic stress disorder), and Substance abuse ([facility]).  he has a past surgical history that includes knee surgery and back surgery.     Allergies Allergies   Allergen Reactions    Molds & Smuts     Mushroom Extract Complex (Do Not Select) Swelling    Mushroom      Other Reaction(s): Swelling/TONGUE & LIPS      Medications   Current Outpatient Medications:     hydrOXYzine pamoate (VISTARIL) 25 MG capsule, TAKE 1 TO 2 CAPSULES BY MOUTH TWICE DAILY AS NEEDED FOR ANXIETY, Disp: , Rfl:     traMADol (ULTRAM) 50 MG tablet, Take 1 tablet by mouth every 6 hours as needed for Pain. Max Daily Amount: 200 mg, Disp: , Rfl:     cyclobenzaprine (FLEXERIL) 10 MG tablet, Take 1 tablet by mouth 3 times daily as needed for Muscle spasms, Disp: , Rfl:     ferrous sulfate (IRON 325) 325 (65 Fe) MG tablet, Take 1 tablet by mouth daily (with breakfast), Disp: 90 tablet, Rfl: 1    traZODone (DESYREL) 50 MG tablet, 2 tablets, Disp: , Rfl:     hydrOXYzine HCl (ATARAX) 25 MG tablet, Take 1 tablet by mouth 3 times daily, Disp: , Rfl:

## 2025-01-23 ENCOUNTER — APPOINTMENT (OUTPATIENT)
Dept: PHYSICAL THERAPY | Age: 40
End: 2025-01-23
Payer: COMMERCIAL

## 2025-01-23 NOTE — TELEPHONE ENCOUNTER
Carlos C Pitson called requesting a refill on the following medications:  Requested Prescriptions     Pending Prescriptions Disp Refills    gabapentin (NEURONTIN) 300 MG capsule [Pharmacy Med Name: GABAPENTIN 300MG CAPSULES] 60 capsule 4     Sig: TAKE 1 CAPSULE BY MOUTH TWICE DAILY FOR 30 DAYS.     Pharmacy verified:  [email or web address]      Date of last visit: 03/19/2024  Date of next visit (if applicable): 01/24/2025

## 2025-01-26 RX ORDER — GABAPENTIN 300 MG/1
CAPSULE ORAL
Qty: 60 CAPSULE | Refills: 0 | Status: SHIPPED | OUTPATIENT
Start: 2025-01-26 | End: 2025-01-27 | Stop reason: SDUPTHER

## 2025-01-27 ENCOUNTER — OFFICE VISIT (OUTPATIENT)
Dept: UROLOGY | Age: 40
End: 2025-01-27
Payer: COMMERCIAL

## 2025-01-27 VITALS — WEIGHT: 226 LBS | BODY MASS INDEX: 31.64 KG/M2 | HEIGHT: 71 IN

## 2025-01-27 DIAGNOSIS — N50.812 TESTICULAR PAIN, LEFT: ICD-10-CM

## 2025-01-27 DIAGNOSIS — N43.3 HYDROCELE IN ADULT: Primary | ICD-10-CM

## 2025-01-27 DIAGNOSIS — R39.15 URINARY URGENCY: Primary | ICD-10-CM

## 2025-01-27 DIAGNOSIS — R39.15 URINARY URGENCY: ICD-10-CM

## 2025-01-27 DIAGNOSIS — I86.1 VARICOCELE: ICD-10-CM

## 2025-01-27 PROCEDURE — 99214 OFFICE O/P EST MOD 30 MIN: CPT | Performed by: NURSE PRACTITIONER

## 2025-01-27 RX ORDER — GABAPENTIN 300 MG/1
300 CAPSULE ORAL 2 TIMES DAILY
Qty: 60 CAPSULE | Refills: 2 | Status: SHIPPED | OUTPATIENT
Start: 2025-01-27 | End: 2025-02-26

## 2025-01-27 NOTE — TELEPHONE ENCOUNTER
Gabapentin script refilled. Will reassess for additional refills/plan of care at [facility] scheduled with Polina tomorrow.

## 2025-01-29 ENCOUNTER — HOSPITAL ENCOUNTER (OUTPATIENT)
Dept: PHYSICAL THERAPY | Age: 40
Setting detail: THERAPIES SERIES
Discharge: HOME | End: 2025-01-29
Payer: COMMERCIAL

## 2025-01-29 PROCEDURE — 97530 THERAPEUTIC ACTIVITIES: CPT

## 2025-01-29 PROCEDURE — 97167 OT EVAL HIGH COMPLEX 60 MIN: CPT

## 2025-01-29 PROCEDURE — 97110 THERAPEUTIC EXERCISES: CPT

## 2025-01-29 NOTE — PROGRESS NOTES
[facility]  OCCUPATIONAL THERAPY  OUTPATIENT REHABILITATION - SPECIALIZED THERAPY SERVICES  [x] PELVIC HEALTH EVALUATION  [] DAILY NOTE  [] PROGRESS NOTE [] DISCHARGE NOTE    Date: 2025  Patient Name:  Carlos Chang  : 1985  MRN: [id number]  CSN: [id number]    Referring Practitioner Polina Childress, APRN - CNP [id number]      Diagnosis  Diagnoses       R39.15 (ICD-10-CM) - Urgency of urination           Treatment Diagnosis M62.81 - Muscle Weakness (Generalized)  R39.15 - Urgency of Urination  K59.00 - Constipation, Unspecified  R10.2 - Pelvic and Perineal Pain  R10.30 - Lower Abdominal Pain, Unspecified  N50.82 - Scrotal Pain  R27.8 - Other Lack of Coordination   Date of Evaluation 25   Additional Pertinent History Carlos Chang has a past medical history of Anxiety, Bipolar disorder ([facility]), PTSD (post-traumatic stress disorder), and Substance abuse ([facility]).  he has a past surgical history that includes knee surgery and back surgery.  Pylonidal cyst (2018)    Allergies Allergies   Allergen Reactions    Molds & Smuts     Mushroom Extract Complex (Do Not Select) Swelling    Mushroom      Other Reaction(s): Swelling/TONGUE & LIPS      Medications   Current Outpatient Medications:     gabapentin (NEURONTIN) 300 MG capsule, Take 1 capsule by mouth in the morning and 1 capsule in the evening. Do all this for 30 days., Disp: 60 capsule, Rfl: 2    hydrOXYzine pamoate (VISTARIL) 25 MG capsule, TAKE 1 TO 2 CAPSULES BY MOUTH TWICE DAILY AS NEEDED FOR ANXIETY, Disp: , Rfl:     traMADol (ULTRAM) 50 MG tablet, Take 1 tablet by mouth every 6 hours as needed for Pain., Disp: , Rfl:     cyclobenzaprine (FLEXERIL) 10 MG tablet, Take 1 tablet by mouth 3 times daily as needed for Muscle spasms, Disp: , Rfl:     ferrous sulfate (IRON 325) 325 (65 Fe) MG tablet, Take 1 tablet by mouth daily (with breakfast), Disp: 90 tablet, Rfl: 1    traZODone (DESYREL) 50 MG tablet, 2 tablets, Disp: ,

## 2025-01-30 ENCOUNTER — HOSPITAL ENCOUNTER (OUTPATIENT)
Dept: MRI IMAGING | Age: 40
Discharge: HOME | End: 2025-01-30
Payer: COMMERCIAL

## 2025-01-30 DIAGNOSIS — M54.2 CERVICALGIA: ICD-10-CM

## 2025-01-30 PROCEDURE — 72141 MRI NECK SPINE W/O DYE: CPT

## 2025-02-11 ENCOUNTER — APPOINTMENT (OUTPATIENT)
Dept: PHYSICAL THERAPY | Age: 40
End: 2025-02-11
Payer: COMMERCIAL

## 2025-02-12 ENCOUNTER — HOSPITAL ENCOUNTER (OUTPATIENT)
Dept: PHYSICAL THERAPY | Age: 40
Setting detail: THERAPIES SERIES
Discharge: HOME | End: 2025-02-12
Payer: COMMERCIAL

## 2025-02-12 PROCEDURE — 97110 THERAPEUTIC EXERCISES: CPT

## 2025-02-12 NOTE — PROGRESS NOTES
[facility]  PHYSICAL THERAPY  [] EVALUATION  [] DAILY NOTE (LAND) [] DAILY NOTE (AQUATIC ) [x] PROGRESS NOTE [] DISCHARGE NOTE    [x] OUTPATIENT REHABILITATION CENTER [facility]   [] [facility] [place]    [] [facility]   [] [facility]    Date: 2025  Patient Name:  Carlos Chang  : 1985  MRN: [id number]  CSN: [id number]    Referring Practitioner Maryam Lombardo MD [id number]      Diagnosis  Diagnoses       R20.2 (ICD-10-CM) - Paresthesia of skin           Treatment Diagnosis M54.59, G89.29  Chronic Lower Back Pain  M79.604  Pain in right leg  M79.605  Pain in left leg  M62.81 Generalized Muscle Weakness  R20.9 Unspecified Disturbance of Skin Sensation  M25.60 Stiffness of Unspecified Joint   Date of Evaluation 24   Additional Pertinent History Carlos Chang has a past medical history of Anxiety, Bipolar disorder ([facility]), PTSD (post-traumatic stress disorder), and Substance abuse ([facility]).  he has a past surgical history that includes knee surgery and back surgery.     Allergies Allergies   Allergen Reactions    Molds & Smuts     Mushroom Extract Complex (Do Not Select) Swelling    Mushroom      Other Reaction(s): Swelling/TONGUE & LIPS      Medications   Current Outpatient Medications:     gabapentin (NEURONTIN) 300 MG capsule, Take 1 capsule by mouth in the morning and 1 capsule in the evening. Do all this for 30 days., Disp: 60 capsule, Rfl: 2    hydrOXYzine pamoate (VISTARIL) 25 MG capsule, TAKE 1 TO 2 CAPSULES BY MOUTH TWICE DAILY AS NEEDED FOR ANXIETY, Disp: , Rfl:     traMADol (ULTRAM) 50 MG tablet, Take 1 tablet by mouth every 6 hours as needed for Pain., Disp: , Rfl:     cyclobenzaprine (FLEXERIL) 10 MG tablet, Take 1 tablet by mouth 3 times daily as needed for Muscle spasms, Disp: , Rfl:     ferrous sulfate (IRON 325) 325 (65 Fe) MG tablet, Take 1 tablet by mouth daily (with breakfast), Disp: 90 tablet, Rfl: 1    traZODone (DESYREL)

## 2025-02-25 ENCOUNTER — APPOINTMENT (OUTPATIENT)
Dept: PHYSICAL THERAPY | Age: 40
End: 2025-02-25
Payer: COMMERCIAL

## 2025-02-26 ENCOUNTER — HOSPITAL ENCOUNTER (OUTPATIENT)
Dept: PHYSICAL THERAPY | Age: 40
Setting detail: THERAPIES SERIES
Discharge: HOME | End: 2025-02-26
Payer: COMMERCIAL

## 2025-02-26 PROCEDURE — 97110 THERAPEUTIC EXERCISES: CPT

## 2025-02-26 NOTE — PROGRESS NOTES
MG tablet, 2 tablets, Disp: , Rfl:     hydrOXYzine HCl (ATARAX) 25 MG tablet, Take 1 tablet by mouth 3 times daily (Patient not taking: Reported on 1/27/2025), Disp: , Rfl:     ibuprofen (IBU) 400 MG tablet, Take 1 tablet by mouth every 6 hours as needed for Pain, Disp: 120 tablet, Rfl: 3    loratadine-pseudoephedrine (CLARITIN-D 12HR) 5-120 MG per extended release tablet, Take 1 tablet by mouth 2 times daily (Patient taking differently: Take 1 tablet by mouth as needed), Disp: 30 tablet, Rfl: 0    prazosin (MINIPRESS) 5 MG capsule, Take 1 capsule by mouth nightly (Patient taking differently: Take 2 capsules by mouth nightly), Disp: 30 capsule, Rfl: 3    acetaminophen (TYLENOL) 325 MG tablet, Take 2 tablets by mouth every 4 hours as needed for Pain or Fever, Disp: 120 tablet, Rfl: 0      Functional Outcome Measure Used Modified Oswestry LBP questionnaire   Functional Outcome Score 28/50 (12/19/24)   25/50 (2/12/25)       Insurance: Primary: Payor: [employer] [place] /  /  / ,   Secondary:    Authorization Information RECERTIFICATION REQUIRED: No  INSURANCE THERAPY BENEFIT: 30 visits soft max.  AQUATIC THERAPY COVERED:   Yes  MODALITIES COVERED:  Yes with the exception Iontophoresis and Hot/Cold Packs.   Initial CPT Codes Requested Authorization of Specific CPT Codes Not Required   Progress Note Counter  8, 1/10 for progress note (Reporting Period: 2/12/25 to 3/13/25  )   Visits approved  30 each yr  (1 completed in 2024)    Recertification Date  03/13/25   Physician Follow-Up  1/16/2025 PCP  OIO consult 1/19/25, ordered cervical MRI and nothing else scheduled   Physician Orders    History of Present Illness Reports chronic long term sensations of numbness in the left side of the body, denies any acute exacerbation. Described as tightness at first, progressed to numbness/ tingling pins and needles. Mostly with sitting, then it improved with getting up and walking around.   But in the last year the pain has

## 2025-03-04 ENCOUNTER — HOSPITAL ENCOUNTER (OUTPATIENT)
Dept: PHYSICAL THERAPY | Age: 40
Setting detail: THERAPIES SERIES
Discharge: HOME | End: 2025-03-04
Payer: COMMERCIAL

## 2025-03-04 PROCEDURE — 97530 THERAPEUTIC ACTIVITIES: CPT

## 2025-03-04 PROCEDURE — 97140 MANUAL THERAPY 1/> REGIONS: CPT

## 2025-03-04 NOTE — PROGRESS NOTES
Goal Timeframe: 12 Weeks  Long-Term Goals:  Patient to be independent with progressed HEP.  Patient will ncrease PFM strength to 4/5 with endurance of 10 seconds x 10 reps to increase pelvic organ support and decrease incontinence.  Patient will Increase abdominal strength of 4/5 or greater to allow for increased pelvic organ support and decreased incontinence.  Patient will decrease urine leak frequency and amount by 75%  This pt will report improved defecation ease and frequency by 75% in order to promote overall pt health.    This pt will report dyspareunia reduced to 1/10 at worst to allow pt to participate in intercourse with relative ease for increased  satisfaction within relationship and/or allow medical examinations to ensure pt health.    This pt will report pelvic pain decreased to 1/10 at worst to increase tolerance to work and home tasks.  This pt will demonstrate NIH-CPSI score decreased to 10 or less in order to indication functional improvement with therapy.       PLAN:  Treatment Recommendations: Strengthening, Endurance Training, Neuromuscular Re-education, Manual Therapy - Soft Tissue Mobilization, Manual Therapy - Joint Manipulation, Pain Management, Home Exercise Program, Patient Education, Self-Care Education and Training, Positioning, Integrative Dry Needling, and Modalities    []  Plan of care initiated.  Plan to see patient 1 times per week for 12 weeks to address the treatment planned outlined above.  [x]  Continue with current plan of care  []  Modify plan of care as follows:    []  Hold pending physician visit  []  Discharge    Time In 0720   Time Out 0815   Timed Code Minutes: 55 min   Total Treatment Time: 55 min       Electronically Signed by: Lexii WHEATLEY OTR/DIAMOND [id number]

## 2025-03-05 ENCOUNTER — HOSPITAL ENCOUNTER (OUTPATIENT)
Dept: PHYSICAL THERAPY | Age: 40
Setting detail: THERAPIES SERIES
Discharge: HOME | End: 2025-03-05
Payer: COMMERCIAL

## 2025-03-05 PROCEDURE — 97110 THERAPEUTIC EXERCISES: CPT

## 2025-03-05 NOTE — THERAPY DISCHARGE
[facility]  PHYSICAL THERAPY  [] EVALUATION  [] DAILY NOTE (LAND) [] DAILY NOTE (AQUATIC ) [] PROGRESS NOTE [x] DISCHARGE NOTE    [x] OUTPATIENT REHABILITATION CENTER [facility]   [] [facility] CARE [place]    [] [facility]   [] [facility]    Date: 3/5/2025  Patient Name:  Carlos Chang  : 1985  MRN: [id number]  CSN: [id number]    Referring Practitioner Maryam Lombardo MD [id number]      Diagnosis  Diagnoses       R20.2 (ICD-10-CM) - Paresthesia of skin           Treatment Diagnosis M54.59, G89.29  Chronic Lower Back Pain  M79.604  Pain in right leg  M79.605  Pain in left leg  M62.81 Generalized Muscle Weakness  R20.9 Unspecified Disturbance of Skin Sensation  M25.60 Stiffness of Unspecified Joint   Date of Evaluation 24   Additional Pertinent History Carlos Chang has a past medical history of Anxiety, Bipolar disorder ([facility]), PTSD (post-traumatic stress disorder), and Substance abuse ([facility]).  he has a past surgical history that includes knee surgery and back surgery.     Allergies Allergies   Allergen Reactions    Molds & Smuts     Mushroom Extract Complex (Obsolete) Swelling    Mushroom      Other Reaction(s): Swelling/TONGUE & LIPS      Medications   Current Outpatient Medications:     gabapentin (NEURONTIN) 300 MG capsule, Take 1 capsule by mouth 3 times daily for 30 days., Disp: 60 capsule, Rfl: 1    hydrOXYzine pamoate (VISTARIL) 25 MG capsule, TAKE 1 TO 2 CAPSULES BY MOUTH TWICE DAILY AS NEEDED FOR ANXIETY, Disp: , Rfl:     traMADol (ULTRAM) 50 MG tablet, Take 1 tablet by mouth every 6 hours as needed for Pain., Disp: , Rfl:     cyclobenzaprine (FLEXERIL) 10 MG tablet, Take 1 tablet by mouth 3 times daily as needed for Muscle spasms, Disp: , Rfl:     ferrous sulfate (IRON 325) 325 (65 Fe) MG tablet, Take 1 tablet by mouth daily (with breakfast), Disp: 90 tablet, Rfl: 1    traZODone (DESYREL) 50 MG tablet, 2 tablets, Disp: , Rfl:

## 2025-03-10 ENCOUNTER — APPOINTMENT (OUTPATIENT)
Dept: PHYSICAL THERAPY | Age: 40
End: 2025-03-10
Payer: COMMERCIAL

## 2025-03-12 ENCOUNTER — TRANSCRIBE ORDERS (OUTPATIENT)
Dept: ADMINISTRATIVE | Age: 40
End: 2025-03-12

## 2025-03-12 DIAGNOSIS — M54.16 LUMBAR RADICULOPATHY: Primary | ICD-10-CM

## 2025-03-13 ENCOUNTER — HOSPITAL ENCOUNTER (OUTPATIENT)
Dept: PHYSICAL THERAPY | Age: 40
Setting detail: THERAPIES SERIES
Discharge: HOME | End: 2025-03-13
Payer: COMMERCIAL

## 2025-03-13 PROCEDURE — 97140 MANUAL THERAPY 1/> REGIONS: CPT

## 2025-03-13 PROCEDURE — 97530 THERAPEUTIC ACTIVITIES: CPT

## 2025-03-13 NOTE — PROGRESS NOTES
[facility]  OCCUPATIONAL THERAPY  OUTPATIENT REHABILITATION - SPECIALIZED THERAPY SERVICES  [] PELVIC HEALTH EVALUATION  [x] DAILY NOTE  [] PROGRESS NOTE [] DISCHARGE NOTE    Date: 3/13/2025  Patient Name:  Carlos Chang  : 1985  MRN: [id number]  CSN: [id number]    Referring Practitioner Polina Childress, APRN - CNP [id number]      Diagnosis  Diagnoses       R39.15 (ICD-10-CM) - Urgency of urination           Treatment Diagnosis M62.81 - Muscle Weakness (Generalized)  R39.15 - Urgency of Urination  K59.00 - Constipation, Unspecified  R10.2 - Pelvic and Perineal Pain  R10.30 - Lower Abdominal Pain, Unspecified  N50.82 - Scrotal Pain  R27.8 - Other Lack of Coordination   Date of Evaluation 25   Additional Pertinent History Carlos Chang has a past medical history of Anxiety, Bipolar disorder ([facility]), PTSD (post-traumatic stress disorder), and Substance abuse ([facility]).  he has a past surgical history that includes knee surgery and back surgery.  Pylonidal cyst (2018)    Allergies Allergies   Allergen Reactions    Molds & Smuts     Mushroom Extract Complex (Obsolete) Swelling    Mushroom      Other Reaction(s): Swelling/TONGUE & LIPS      Medications   Current Outpatient Medications:     gabapentin (NEURONTIN) 300 MG capsule, Take 1 capsule by mouth 3 times daily for 30 days., Disp: 60 capsule, Rfl: 1    hydrOXYzine pamoate (VISTARIL) 25 MG capsule, TAKE 1 TO 2 CAPSULES BY MOUTH TWICE DAILY AS NEEDED FOR ANXIETY, Disp: , Rfl:     traMADol (ULTRAM) 50 MG tablet, Take 1 tablet by mouth every 6 hours as needed for Pain., Disp: , Rfl:     cyclobenzaprine (FLEXERIL) 10 MG tablet, Take 1 tablet by mouth 3 times daily as needed for Muscle spasms, Disp: , Rfl:     ferrous sulfate (IRON 325) 325 (65 Fe) MG tablet, Take 1 tablet by mouth daily (with breakfast), Disp: 90 tablet, Rfl: 1    traZODone (DESYREL) 50 MG tablet, 2 tablets, Disp: , Rfl:     hydrOXYzine HCl (ATARAX) 25 MG tablet,

## 2025-03-14 ENCOUNTER — HOSPITAL ENCOUNTER (OUTPATIENT)
Dept: MRI IMAGING | Age: 40
Discharge: HOME | End: 2025-03-14
Payer: COMMERCIAL

## 2025-03-14 DIAGNOSIS — M54.16 LUMBAR RADICULOPATHY: ICD-10-CM

## 2025-03-14 PROCEDURE — 72148 MRI LUMBAR SPINE W/O DYE: CPT

## 2025-03-17 ENCOUNTER — HOSPITAL ENCOUNTER (OUTPATIENT)
Dept: PHYSICAL THERAPY | Age: 40
Setting detail: THERAPIES SERIES
Discharge: HOME | End: 2025-03-17
Payer: COMMERCIAL

## 2025-03-17 PROCEDURE — 97140 MANUAL THERAPY 1/> REGIONS: CPT

## 2025-03-17 NOTE — PROGRESS NOTES
12 weeks to address the treatment planned outlined above.  [x]  Continue with current plan of care  []  Modify plan of care as follows:    []  Hold pending physician visit  []  Discharge    Time In 1000   Time Out 1100   Timed Code Minutes: 60 min   Total Treatment Time: 60 min       Electronically Signed by: Lexii WHEATLEY, OTR/L [id number]

## 2025-03-26 ENCOUNTER — HOSPITAL ENCOUNTER (OUTPATIENT)
Dept: PHYSICAL THERAPY | Age: 40
Setting detail: THERAPIES SERIES
Discharge: HOME | End: 2025-03-26
Payer: COMMERCIAL

## 2025-03-26 PROCEDURE — 97140 MANUAL THERAPY 1/> REGIONS: CPT

## 2025-03-26 NOTE — PROGRESS NOTES
[facility]  OCCUPATIONAL THERAPY  OUTPATIENT REHABILITATION - SPECIALIZED THERAPY SERVICES  [] PELVIC HEALTH EVALUATION  [x] DAILY NOTE  [] PROGRESS NOTE [] DISCHARGE NOTE    Date: 3/26/2025  Patient Name:  Carlos Chang  : 1985  MRN: [id number]  CSN: [id number]    Referring Practitioner Polina Childress, APRN - CNP [phone]      Diagnosis  Diagnoses       R39.15 (ICD-10-CM) - Urgency of urination           Treatment Diagnosis M62.81 - Muscle Weakness (Generalized)  R39.15 - Urgency of Urination  K59.00 - Constipation, Unspecified  R10.2 - Pelvic and Perineal Pain  R10.30 - Lower Abdominal Pain, Unspecified  N50.82 - Scrotal Pain  R27.8 - Other Lack of Coordination   Date of Evaluation 25   Additional Pertinent History Carlos Chang has a past medical history of Anxiety, Bipolar disorder ([facility]), PTSD (post-traumatic stress disorder), and Substance abuse ([facility]).  he has a past surgical history that includes knee surgery and back surgery.  Pylonidal cyst (2018)    Allergies Allergies   Allergen Reactions    Molds & Smuts     Mushroom Extract Complex (Obsolete) Swelling    Mushroom      Other Reaction(s): Swelling/TONGUE & LIPS      Medications   Current Outpatient Medications:     gabapentin (NEURONTIN) 300 MG capsule, Take 1 capsule by mouth 3 times daily for 30 days., Disp: 60 capsule, Rfl: 1    hydrOXYzine pamoate (VISTARIL) 25 MG capsule, TAKE 1 TO 2 CAPSULES BY MOUTH TWICE DAILY AS NEEDED FOR ANXIETY, Disp: , Rfl:     traMADol (ULTRAM) 50 MG tablet, Take 1 tablet by mouth every 6 hours as needed for Pain., Disp: , Rfl:     cyclobenzaprine (FLEXERIL) 10 MG tablet, Take 1 tablet by mouth 3 times daily as needed for Muscle spasms, Disp: , Rfl:     ferrous sulfate (IRON 325) 325 (65 Fe) MG tablet, Take 1 tablet by mouth daily (with breakfast), Disp: 90 tablet, Rfl: 1    traZODone (DESYREL) 50 MG tablet, 2 tablets, Disp: , Rfl:     hydrOXYzine HCl (ATARAX) 25 MG tablet,

## 2025-04-02 ENCOUNTER — HOSPITAL ENCOUNTER (OUTPATIENT)
Dept: PHYSICAL THERAPY | Age: 40
Setting detail: THERAPIES SERIES
Discharge: HOME | End: 2025-04-02
Payer: COMMERCIAL

## 2025-04-02 PROCEDURE — 97140 MANUAL THERAPY 1/> REGIONS: CPT

## 2025-04-02 NOTE — PROGRESS NOTES
tested today.  For reference only on this daily note.     Frequency: 3 times per week    Most Common Stool Consistency: Hard to soft    SYMPTOM QUESTIONNAIRE   Strain to have a BM: yes: at times    Include fiber in your diet: no   Take laxatives/enemas regularly: no   Pain with BM: Yes; with hard stools    Strong urge to have BM: no   Leak/Stain Feces: no   Diarrhea often: no         SEXUAL ASSESSMENT [x] Deferred secondary to:Information below from evaluation, not tested today.  For reference only on this daily note.     Sexually Active yes   Pain with [place] (Dyspareunia) No pain reported   Pain After [place] Yes; even with having an erection and losing it he will then have pain         OBSERVATION  [] Deferred secondary to:   Patient Safety Patient offered a chaperone and declined. This pt was educated in the purpose and procedure of PFM examination to be completed. He was educated in his right to refuse part or all of this activity, and to stop this activity if he becomes uncomfortable. He was educated that he is not obligated to give a reason and that it would not in any way impact his ability to seek further care with this therapist at this facility or result in therapist prejudice regarding his motivation to get better. He verbalized understanding and gave consent again for PFM examination to be completed today.     Skin Condition    Urogenital Triangle Bulbocavernosus tender/tight, Ischiocavernosus tender/tight, STPM tender/tight, Levator ani tender/tight, and OI tight/tender    Introitus     Perineal Descent     External Clock         PELVIC FLOOR INTERNAL EXAM [] Deferred secondary to:   Exam Rectal   Sensation Intact   Muscle Localization Fair - pt was able to contract the PFM with vc    Palpation/Tone Hypertonic   Pelvic Floor Strength PERF:Power: 2,Endurance: 3,Reps: 10,Flicks: 10   Relax after Contraction Difficult   Prolapse None         PELVIC HEALTH INCONTINENCE TREATMENT   Precautions:

## 2025-04-09 ENCOUNTER — HOSPITAL ENCOUNTER (OUTPATIENT)
Dept: PHYSICAL THERAPY | Age: 40
Setting detail: THERAPIES SERIES
Discharge: HOME | End: 2025-04-09
Payer: COMMERCIAL

## 2025-04-09 PROCEDURE — 97140 MANUAL THERAPY 1/> REGIONS: CPT

## 2025-04-09 NOTE — PROGRESS NOTES
[facility]  OCCUPATIONAL THERAPY  OUTPATIENT REHABILITATION - SPECIALIZED THERAPY SERVICES  [] PELVIC HEALTH EVALUATION  [x] DAILY NOTE  [] PROGRESS NOTE [] DISCHARGE NOTE    Date: 2025  Patient Name:  Carlos Chang  : 1985  MRN: [id number]  CSN: [id number]    Referring Practitioner Polina Childress, APRN - CNP [id number]      Diagnosis  Diagnoses       R39.15 (ICD-10-CM) - Urgency of urination           Treatment Diagnosis M62.81 - Muscle Weakness (Generalized)  R39.15 - Urgency of Urination  K59.00 - Constipation, Unspecified  R10.2 - Pelvic and Perineal Pain  R10.30 - Lower Abdominal Pain, Unspecified  N50.82 - Scrotal Pain  R27.8 - Other Lack of Coordination   Date of Evaluation 25   Additional Pertinent History Carlos Chang has a past medical history of Anxiety, Bipolar disorder ([facility]), PTSD (post-traumatic stress disorder), and Substance abuse ([facility]).  he has a past surgical history that includes knee surgery and back surgery.  Pylonidal cyst (2018)    Allergies Allergies   Allergen Reactions    Molds & Smuts     Mushroom Extract Complex (Obsolete) Swelling    Mushroom      Other Reaction(s): Swelling/TONGUE & LIPS      Medications   Current Outpatient Medications:     gabapentin (NEURONTIN) 300 MG capsule, Take 1 capsule by mouth 3 times daily for 30 days., Disp: 60 capsule, Rfl: 1    hydrOXYzine pamoate (VISTARIL) 25 MG capsule, TAKE 1 TO 2 CAPSULES BY MOUTH TWICE DAILY AS NEEDED FOR ANXIETY, Disp: , Rfl:     traMADol (ULTRAM) 50 MG tablet, Take 1 tablet by mouth every 6 hours as needed for Pain., Disp: , Rfl:     cyclobenzaprine (FLEXERIL) 10 MG tablet, Take 1 tablet by mouth 3 times daily as needed for Muscle spasms, Disp: , Rfl:     ferrous sulfate (IRON 325) 325 (65 Fe) MG tablet, Take 1 tablet by mouth daily (with breakfast), Disp: 90 tablet, Rfl: 1    traZODone (DESYREL) 50 MG tablet, 2 tablets, Disp: , Rfl:     hydrOXYzine HCl (ATARAX) 25 MG

## 2025-04-10 ENCOUNTER — OFFICE VISIT (OUTPATIENT)
Dept: NEUROLOGY | Age: 40
End: 2025-04-10
Payer: COMMERCIAL

## 2025-04-10 ENCOUNTER — RESULTS FOLLOW-UP (OUTPATIENT)
Dept: NEUROLOGY | Age: 40
End: 2025-04-10

## 2025-04-10 ENCOUNTER — HOSPITAL ENCOUNTER (OUTPATIENT)
Age: 40
Discharge: HOME | End: 2025-04-10
Payer: COMMERCIAL

## 2025-04-10 VITALS
DIASTOLIC BLOOD PRESSURE: 85 MMHG | BODY MASS INDEX: 34.93 KG/M2 | HEART RATE: 84 BPM | HEIGHT: 70 IN | OXYGEN SATURATION: 97 % | WEIGHT: 244 LBS | SYSTOLIC BLOOD PRESSURE: 125 MMHG

## 2025-04-10 DIAGNOSIS — R20.0 LEFT LEG NUMBNESS: Primary | ICD-10-CM

## 2025-04-10 DIAGNOSIS — R20.0 NUMBNESS: ICD-10-CM

## 2025-04-10 DIAGNOSIS — R32 URINARY INCONTINENCE, UNSPECIFIED TYPE: ICD-10-CM

## 2025-04-10 DIAGNOSIS — R20.0 LEFT LEG NUMBNESS: ICD-10-CM

## 2025-04-10 LAB
25(OH)D3 SERPL-MCNC: 20 NG/ML (ref 30–100)
CRP SERPL-MCNC: 0.31 MG/DL (ref 0–0.5)
ERYTHROCYTE [SEDIMENTATION RATE] IN BLOOD BY WESTERGREN METHOD: 3 MM/HR (ref 0–10)
RHEUMATOID FACT SERPL-ACNC: < 10 IU/ML (ref 0–13)

## 2025-04-10 PROCEDURE — 86140 C-REACTIVE PROTEIN: CPT

## 2025-04-10 PROCEDURE — 85651 RBC SED RATE NONAUTOMATED: CPT

## 2025-04-10 PROCEDURE — 82525 ASSAY OF COPPER: CPT

## 2025-04-10 PROCEDURE — 84165 PROTEIN E-PHORESIS SERUM: CPT

## 2025-04-10 PROCEDURE — 86430 RHEUMATOID FACTOR TEST QUAL: CPT

## 2025-04-10 PROCEDURE — 36415 COLL VENOUS BLD VENIPUNCTURE: CPT

## 2025-04-10 PROCEDURE — 86235 NUCLEAR ANTIGEN ANTIBODY: CPT

## 2025-04-10 PROCEDURE — 86225 DNA ANTIBODY NATIVE: CPT

## 2025-04-10 PROCEDURE — 99205 OFFICE O/P NEW HI 60 MIN: CPT | Performed by: PSYCHIATRY & NEUROLOGY

## 2025-04-10 PROCEDURE — 86038 ANTINUCLEAR ANTIBODIES: CPT

## 2025-04-10 PROCEDURE — 84155 ASSAY OF PROTEIN SERUM: CPT

## 2025-04-10 PROCEDURE — 82306 VITAMIN D 25 HYDROXY: CPT

## 2025-04-10 NOTE — PATIENT INSTRUCTIONS
MRI brain W/WO contrast  MRI thoracic spine W/Wo contrast   Copper level  Vitamin D level   Will order connective tissue screen including MARTITA, rheumatoid factor, antidouble-stranded DNA, anti-SSA, anti-SSB in addition to serum protein electrophoresis and sed rate.  Consider referral to neurology at higher level of care as a next step  Call with any new symptoms or concerns.

## 2025-04-11 NOTE — TELEPHONE ENCOUNTER
Spoke to the patient regarding results. Verbalized understanding. Results routed to PCP- Maryam Lombardo for review.

## 2025-04-12 LAB
COPPER SERPL-MCNC: 96 UG/DL (ref 70–140)
DSDNA IGG SER QL IA: NORMAL
ENA SS-A 60KD AB SER-ACNC: NORMAL
ENA SS-A IGG SER QL: NORMAL
ENA SS-B IGG SER IA-ACNC: 2 AU/ML (ref 0–40)
NUCLEAR IGG SER QL IA: NORMAL

## 2025-04-13 LAB — PROTEIN ELECTROPHORESIS, SERUM: NORMAL

## 2025-04-14 ENCOUNTER — OFFICE VISIT (OUTPATIENT)
Dept: FAMILY MEDICINE CLINIC | Age: 40
End: 2025-04-14
Payer: COMMERCIAL

## 2025-04-14 VITALS
DIASTOLIC BLOOD PRESSURE: 80 MMHG | HEIGHT: 70 IN | RESPIRATION RATE: 16 BRPM | TEMPERATURE: 97.6 F | HEART RATE: 60 BPM | BODY MASS INDEX: 34.47 KG/M2 | SYSTOLIC BLOOD PRESSURE: 130 MMHG | WEIGHT: 240.8 LBS

## 2025-04-14 DIAGNOSIS — F43.10 PTSD (POST-TRAUMATIC STRESS DISORDER): ICD-10-CM

## 2025-04-14 DIAGNOSIS — F41.9 ANXIETY: ICD-10-CM

## 2025-04-14 DIAGNOSIS — F31.62 BIPOLAR DISORDER, CURRENT EPISODE MIXED, MODERATE (HCC): ICD-10-CM

## 2025-04-14 DIAGNOSIS — Z11.3 ROUTINE SCREENING FOR STI (SEXUALLY TRANSMITTED INFECTION): ICD-10-CM

## 2025-04-14 DIAGNOSIS — K21.9 GASTROESOPHAGEAL REFLUX DISEASE, UNSPECIFIED WHETHER ESOPHAGITIS PRESENT: Primary | ICD-10-CM

## 2025-04-14 DIAGNOSIS — E55.9 VITAMIN D DEFICIENCY: ICD-10-CM

## 2025-04-14 DIAGNOSIS — R20.2 PARESTHESIA OF LEFT LEG: ICD-10-CM

## 2025-04-14 PROCEDURE — G2211 COMPLEX E/M VISIT ADD ON: HCPCS | Performed by: STUDENT IN AN ORGANIZED HEALTH CARE EDUCATION/TRAINING PROGRAM

## 2025-04-14 PROCEDURE — 99214 OFFICE O/P EST MOD 30 MIN: CPT | Performed by: STUDENT IN AN ORGANIZED HEALTH CARE EDUCATION/TRAINING PROGRAM

## 2025-04-14 RX ORDER — PANTOPRAZOLE SODIUM 40 MG/1
40 TABLET, DELAYED RELEASE ORAL
Qty: 90 TABLET | Refills: 1 | Status: SHIPPED | OUTPATIENT
Start: 2025-04-14

## 2025-04-14 RX ORDER — ACETAMINOPHEN 160 MG
2000 TABLET,DISINTEGRATING ORAL DAILY
Qty: 90 CAPSULE | Refills: 1 | Status: SHIPPED | OUTPATIENT
Start: 2025-04-14

## 2025-04-14 ASSESSMENT — ENCOUNTER SYMPTOMS
DIARRHEA: 0
BACK PAIN: 1
ABDOMINAL PAIN: 1
SHORTNESS OF BREATH: 0
CONSTIPATION: 0
NAUSEA: 0
VOMITING: 0

## 2025-04-14 NOTE — PROGRESS NOTES
Carlos Chang is a 39 y.o. male who presents today for:  Chief Complaint   Patient presents with    Follow-up     3 month follow-up, would like to discuss recent results.   Patient requesting STI check         HPI:     History of Present Illness  The patient presents for a follow-up visit.    He reports an improvement in his overall well-being, with enhanced social interactions and mood stability. Continued engagement with his counselor and psychiatrist is noted, with current management of his care until a suitable referral can be made. Autism testing is sought to further understand his condition, leading to significant self-awareness and positive life impacts. Numerous triggers previously led to erratic behavior, but coping mechanisms have been developed to manage these responses. No suicidal ideation or self-harm tendencies are reported.    A recent consultation with a neurologist is investigating potential autoimmune and nervous system disorders contributing to leg paresthesias, with MRIs of the mid-back and brain scheduled for 05/02/2025. Blood work has been completed, revealing low vitamin D levels, and he is open to vitamin D supplementation, requesting a prescription. Current medications include gabapentin 600 mg 3 times daily, Flexeril, and trazodone, which collectively induce a sense of calmness. Hydroxyzine Vistaril 100 mg daily is also taken, providing relief from itching and tingling sensations.    Acknowledgment of issues stemming from anxiety and trauma is noted, with persistent body pain reported. Anxiety has been a long-term issue, leading to severe agoraphobia and reluctance to socialize. Resuming therapy is believed to be beneficial. Consideration of stronger medication for anxiety is mentioned, but hesitancy to discuss this with his psychiatrist is expressed.    Severe heartburn is reported, resulting in episodes of choking on vomit during sleep. Relief is found with omeprazole and

## 2025-04-15 ENCOUNTER — HOSPITAL ENCOUNTER (OUTPATIENT)
Dept: PHYSICAL THERAPY | Age: 40
Setting detail: THERAPIES SERIES
Discharge: HOME | End: 2025-04-15
Payer: COMMERCIAL

## 2025-04-15 PROCEDURE — 97140 MANUAL THERAPY 1/> REGIONS: CPT

## 2025-04-15 NOTE — PROGRESS NOTES
for decreased pain and PFM dysfunction (skin integrity intact pre and post treatment). Pt reports full understanding of education and instruction provided this date.     Body Structures/Functions/Activity Limitations: PFM weakness with decreased localization and endurance, PFM spasm, Poor PFM control with limited ability to completely relax, Decreased awareness of functional factors that increase pelvic organ strain, Decreased awareness of normal bladder and bowel habits, control, and diet effects on functioning, Abdominal and core weakness, and Pain  Prognosis: Good    GOALS:  Patient Goal: to decrease pain     Short Term Goal Timeframe: 6 Weeks  Short Term Goals:  Patient to be independent with basic HEP. GOAL MET   Patient will decrease urine leak frequency and amount by 25% due to increasing PFM strength, endurance and localization. GOAL NOT MET   Patient to identify normal bladder function and voiding patterns, diet effects on bladder, urge control strategies, and constipation/optimal stool consistency management. GOAL NOT MET   Patient will Increase PFM localization to good with good ability to instantly and completely relax. GOAL NOT MET   Patient to demonstrate normal stool consistency 25% of the time with 25% improvement of sensation of complete defecation due to improved toileting and dietary habits along with improving PRM control. GOAL NOT MET   Patient to reduce need for manual assist with BM 25% due to improved toileting habits and stool motility to allow for reduction of colon pain. GOAL NOT MET   Patient will report pelvic pain reduced to 5/10 at worst to allow initiation of manual techniques.   GOAL NOT MET   Patient will report dyspareunia decreased to 5/10 to allow pt to participate in intercourse in a modified fashion for increased relationship satisfaction.  GOAL NOT MET     Long Term Goal Timeframe: 12 Weeks  Long-Term Goals:  Patient to be independent with progressed HEP. GOAL MET   Patient

## 2025-04-22 ENCOUNTER — HOSPITAL ENCOUNTER (OUTPATIENT)
Dept: PHYSICAL THERAPY | Age: 40
Setting detail: THERAPIES SERIES
Discharge: HOME | End: 2025-04-22
Payer: COMMERCIAL

## 2025-04-22 PROCEDURE — 97140 MANUAL THERAPY 1/> REGIONS: CPT

## 2025-04-22 NOTE — PROGRESS NOTES
[facility]  OCCUPATIONAL THERAPY  OUTPATIENT REHABILITATION - SPECIALIZED THERAPY SERVICES  [] PELVIC HEALTH EVALUATION  [x] DAILY NOTE  [] PROGRESS NOTE [] DISCHARGE NOTE    Date: 2025  Patient Name:  Carlos Chang  : 1985  MRN: [id number]  CSN: [id number]    Referring Practitioner Polina Childress, APRN - CNP [id number]      Diagnosis  Diagnoses       R39.15 (ICD-10-CM) - Urgency of urination           Treatment Diagnosis M62.81 - Muscle Weakness (Generalized)  R39.15 - Urgency of Urination  K59.00 - Constipation, Unspecified  R10.2 - Pelvic and Perineal Pain  R10.30 - Lower Abdominal Pain, Unspecified  N50.82 - Scrotal Pain  R27.8 - Other Lack of Coordination   Date of Evaluation 25   Additional Pertinent History Carlos Chang has a past medical history of Anxiety, Bipolar disorder, PTSD (post-traumatic stress disorder), and Substance abuse.  he has a past surgical history that includes knee surgery (Left) and back surgery.  Pylonidal cyst (2018)    Allergies Allergies   Allergen Reactions    Molds & Smuts     Mushroom Extract Complex (Obsolete) Swelling    Mushroom      Other Reaction(s): Swelling/TONGUE & LIPS      Medications   Current Outpatient Medications:     vitamin D (VITAMIN D3) 50 MCG ( [place]) CAPS capsule, Take 1 capsule by mouth daily, Disp: 90 capsule, Rfl: 1    pantoprazole (PROTONIX) 40 MG tablet, Take 1 tablet by mouth every morning (before breakfast), Disp: 90 tablet, Rfl: 1    gabapentin (NEURONTIN) 300 MG capsule, Take 1 capsule by mouth 3 times daily for 30 days. (Patient taking differently: Take 1 capsule by mouth in the morning and at bedtime.), Disp: 60 capsule, Rfl: 1    hydrOXYzine pamoate (VISTARIL) 25 MG capsule, TAKE 1 TO 2 CAPSULES BY MOUTH TWICE DAILY AS NEEDED FOR ANXIETY, Disp: , Rfl:     traMADol (ULTRAM) 50 MG tablet, Take 1 tablet by mouth every 6 hours as needed for Pain., Disp: , Rfl:     cyclobenzaprine (FLEXERIL) 10

## 2025-05-19 ENCOUNTER — HOSPITAL ENCOUNTER (OUTPATIENT)
Dept: PHYSICAL THERAPY | Age: 40
Setting detail: THERAPIES SERIES
Discharge: HOME | End: 2025-05-19
Payer: COMMERCIAL

## 2025-05-19 PROCEDURE — 97140 MANUAL THERAPY 1/> REGIONS: CPT

## 2025-05-19 PROCEDURE — 97530 THERAPEUTIC ACTIVITIES: CPT

## 2025-05-19 NOTE — PROGRESS NOTES
* PLEASE SIGN, DATE AND TIME CERTIFICATION BELOW AND RETURN TO [facility] OUTPATIENT REHABILITATION (FAX #: [phone]).  ATTEST/CO-SIGN IF ACCESSING VIA [employer]  THANK YOU.**    I certify that I have examined the patient below and determined that Physical Medicine and Rehabilitation service is necessary and that I approve the established plan of care for up to 90 days or as specifically noted.  Attestation, signature or co-signature of physician indicates approval of certification requirements.    ________________________ ____________  Physician Signature   Date    [facility]  OCCUPATIONAL THERAPY  OUTPATIENT REHABILITATION - SPECIALIZED THERAPY SERVICES  [] PELVIC HEALTH EVALUATION  [] DAILY NOTE  [x] PROGRESS NOTE [] DISCHARGE NOTE    Date: 2025  Patient Name:  Carlos Chang  : 1985  MRN: [id number]  CSN: [id number]    Referring Practitioner Polina Childress, APRN - CNP [id number]      Diagnosis  Diagnoses       R39.15 (ICD-10-CM) - Urgency of urination           Treatment Diagnosis M62.81 - Muscle Weakness (Generalized)  R39.15 - Urgency of Urination  K59.00 - Constipation, Unspecified  R10.2 - Pelvic and Perineal Pain  R10.30 - Lower Abdominal Pain, Unspecified  N50.82 - Scrotal Pain  R27.8 - Other Lack of Coordination   Date of Evaluation 25   Additional Pertinent History Carlos Chang has a past medical history of Anxiety, Bipolar disorder ([facility]), PTSD (post-traumatic stress disorder), and Substance abuse ([facility]).  he has a past surgical history that includes knee surgery (Left) and back surgery.  Pylonidal cyst (2018)    Allergies Allergies   Allergen Reactions    Molds & Smuts     Mushroom Extract Complex (Obsolete) Swelling    Mushroom      Other Reaction(s): Swelling/TONGUE & LIPS      Medications   Current Outpatient Medications:     vitamin D (VITAMIN D3) 50 MCG ( UT) CAPS capsule, Take 1 capsule by mouth daily, Disp: 90 capsule, Rfl: 1

## 2025-05-27 ENCOUNTER — HOSPITAL ENCOUNTER (OUTPATIENT)
Dept: PHYSICAL THERAPY | Age: 40
Setting detail: THERAPIES SERIES
Discharge: HOME | End: 2025-05-27
Payer: COMMERCIAL

## 2025-05-27 PROCEDURE — 97140 MANUAL THERAPY 1/> REGIONS: CPT

## 2025-05-27 PROCEDURE — 97530 THERAPEUTIC ACTIVITIES: CPT

## 2025-05-27 NOTE — PROGRESS NOTES
[facility]  OCCUPATIONAL THERAPY  OUTPATIENT REHABILITATION - SPECIALIZED THERAPY SERVICES  [] PELVIC HEALTH EVALUATION  [x] DAILY NOTE  [] PROGRESS NOTE [] DISCHARGE NOTE    Date: 2025  Patient Name:  Carlos Chang  : 1985  MRN: [id number]  CSN: [id number]    Referring Practitioner Polina Childress, APRN - CNP [id number]      Diagnosis  Diagnoses       R39.15 (ICD-10-CM) - Urgency of urination           Treatment Diagnosis M62.81 - Muscle Weakness (Generalized)  R39.15 - Urgency of Urination  K59.00 - Constipation, Unspecified  R10.2 - Pelvic and Perineal Pain  R10.30 - Lower Abdominal Pain, Unspecified  N50.82 - Scrotal Pain  R27.8 - Other Lack of Coordination   Date of Evaluation 25   Additional Pertinent History Carlos Chang has a past medical history of Anxiety, Bipolar disorder ([facility]), PTSD (post-traumatic stress disorder), and Substance abuse ([facility]).  he has a past surgical history that includes knee surgery (Left) and back surgery.  Pylonidal cyst (2018)    Allergies Allergies   Allergen Reactions    Molds & Smuts     Mushroom Extract Complex (Obsolete) Swelling    Mushroom      Other Reaction(s): Swelling/TONGUE & LIPS      Medications   Current Outpatient Medications:     vitamin D (VITAMIN D3) 50 MCG ( UT) CAPS capsule, Take 1 capsule by mouth daily, Disp: 90 capsule, Rfl: 1    pantoprazole (PROTONIX) 40 MG tablet, Take 1 tablet by mouth every morning (before breakfast), Disp: 90 tablet, Rfl: 1    gabapentin (NEURONTIN) 300 MG capsule, Take 1 capsule by mouth 3 times daily for 30 days. (Patient taking differently: Take 1 capsule by mouth in the morning and at bedtime.), Disp: 60 capsule, Rfl: 1    hydrOXYzine pamoate (VISTARIL) 25 MG capsule, TAKE 1 TO 2 CAPSULES BY MOUTH TWICE DAILY AS NEEDED FOR ANXIETY, Disp: , Rfl:     traMADol (ULTRAM) 50 MG tablet, Take 1 tablet by mouth every 6 hours as needed for Pain., Disp: , Rfl:     cyclobenzaprine

## 2025-06-03 DIAGNOSIS — R32 URINARY INCONTINENCE, UNSPECIFIED TYPE: ICD-10-CM

## 2025-06-03 DIAGNOSIS — R20.0 NUMBNESS: Primary | ICD-10-CM

## 2025-06-03 DIAGNOSIS — R20.0 LEFT LEG NUMBNESS: ICD-10-CM

## 2025-06-05 ENCOUNTER — HOSPITAL ENCOUNTER (OUTPATIENT)
Dept: PHYSICAL THERAPY | Age: 40
Setting detail: THERAPIES SERIES
Discharge: HOME | End: 2025-06-05
Payer: COMMERCIAL

## 2025-06-05 PROCEDURE — 97140 MANUAL THERAPY 1/> REGIONS: CPT

## 2025-06-05 NOTE — PROGRESS NOTES
[facility]  OCCUPATIONAL THERAPY  OUTPATIENT REHABILITATION - SPECIALIZED THERAPY SERVICES  [] PELVIC HEALTH EVALUATION  [x] DAILY NOTE  [] PROGRESS NOTE [] DISCHARGE NOTE    Date: 2025  Patient Name:  Carlos Chang  : 1985  MRN: [id number]  CSN: [id number]    Referring Practitioner Polina Childress, APRN - CNP [id number]      Diagnosis  Diagnoses       R39.15 (ICD-10-CM) - Urgency of urination           Treatment Diagnosis M62.81 - Muscle Weakness (Generalized)  R39.15 - Urgency of Urination  K59.00 - Constipation, Unspecified  R10.2 - Pelvic and Perineal Pain  R10.30 - Lower Abdominal Pain, Unspecified  N50.82 - Scrotal Pain  R27.8 - Other Lack of Coordination   Date of Evaluation 25   Additional Pertinent History Carlos Chang has a past medical history of Anxiety, Bipolar disorder ([facility]), PTSD (post-traumatic stress disorder), and Substance abuse ([facility]).  he has a past surgical history that includes knee surgery (Left) and back surgery.  Pylonidal cyst (2018)    Allergies Allergies   Allergen Reactions    Molds & Smuts     Mushroom Extract Complex (Obsolete) Swelling    Mushroom      Other Reaction(s): Swelling/TONGUE & LIPS      Medications   Current Outpatient Medications:     vitamin D (VITAMIN D3) 50 MCG ( UT) CAPS capsule, Take 1 capsule by mouth daily, Disp: 90 capsule, Rfl: 1    pantoprazole (PROTONIX) 40 MG tablet, Take 1 tablet by mouth every morning (before breakfast), Disp: 90 tablet, Rfl: 1    gabapentin (NEURONTIN) 300 MG capsule, Take 1 capsule by mouth 3 times daily for 30 days. (Patient taking differently: Take 1 capsule by mouth in the morning and at bedtime.), Disp: 60 capsule, Rfl: 1    hydrOXYzine pamoate (VISTARIL) 25 MG capsule, TAKE 1 TO 2 CAPSULES BY MOUTH TWICE DAILY AS NEEDED FOR ANXIETY, Disp: , Rfl:     traMADol (ULTRAM) 50 MG tablet, Take 1 tablet by mouth every 6 hours as needed for Pain., Disp: , Rfl:     cyclobenzaprine

## 2025-06-17 ENCOUNTER — HOSPITAL ENCOUNTER (OUTPATIENT)
Dept: PHYSICAL THERAPY | Age: 40
Setting detail: THERAPIES SERIES
Discharge: HOME | End: 2025-06-17
Payer: COMMERCIAL

## 2025-06-17 PROCEDURE — 97140 MANUAL THERAPY 1/> REGIONS: CPT

## 2025-06-17 NOTE — PROGRESS NOTES
[facility]  OCCUPATIONAL THERAPY  OUTPATIENT REHABILITATION - SPECIALIZED THERAPY SERVICES  [] PELVIC HEALTH EVALUATION  [x] DAILY NOTE  [] PROGRESS NOTE [] DISCHARGE NOTE    Date: 2025  Patient Name:  Cralos Chang  : 1985  MRN: [id number]  CSN: [id number]    Referring Practitioner Polina Childress, APRN - CNP [id number]      Diagnosis  Diagnoses       R39.15 (ICD-10-CM) - Urgency of urination           Treatment Diagnosis M62.81 - Muscle Weakness (Generalized)  R39.15 - Urgency of Urination  K59.00 - Constipation, Unspecified  R10.2 - Pelvic and Perineal Pain  R10.30 - Lower Abdominal Pain, Unspecified  N50.82 - Scrotal Pain  R27.8 - Other Lack of Coordination   Date of Evaluation 25   Additional Pertinent History Carlos Chang has a past medical history of Anxiety, Bipolar disorder ([facility]ly [facility]), PTSD (post-traumatic stress disorder), and Substance abuse ([facility]).  he has a past surgical history that includes knee surgery (Left) and back surgery.  Pylonidal cyst (2018)    Allergies Allergies   Allergen Reactions    Molds & Smuts     Mushroom Extract Complex (Obsolete) Swelling    Mushroom      Other Reaction(s): Swelling/TONGUE & LIPS      Medications   Current Outpatient Medications:     vitamin D (VITAMIN D3) 50 MCG ( UT) CAPS capsule, Take 1 capsule by mouth daily, Disp: 90 capsule, Rfl: 1    pantoprazole (PROTONIX) 40 MG tablet, Take 1 tablet by mouth every morning (before breakfast), Disp: 90 tablet, Rfl: 1    gabapentin (NEURONTIN) 300 MG capsule, Take 1 capsule by mouth 3 times daily for 30 days. (Patient taking differently: Take 1 capsule by mouth in the morning and at bedtime.), Disp: 60 capsule, Rfl: 1    hydrOXYzine pamoate (VISTARIL) 25 MG capsule, TAKE 1 TO 2 CAPSULES BY MOUTH TWICE DAILY AS NEEDED FOR ANXIETY, Disp: , Rfl:     traMADol (ULTRAM) 50 MG tablet, Take 1 tablet by mouth every 6 hours as needed for Pain., Disp: , Rfl:     cyclobenzaprine

## 2025-06-25 ENCOUNTER — HOSPITAL ENCOUNTER (OUTPATIENT)
Dept: PHYSICAL THERAPY | Age: 40
Setting detail: THERAPIES SERIES
Discharge: HOME | End: 2025-06-25
Payer: COMMERCIAL

## 2025-06-25 PROCEDURE — 97140 MANUAL THERAPY 1/> REGIONS: CPT

## 2025-06-25 NOTE — PROGRESS NOTES
decreased pain and PFM dysfunction. Skin integrity intact pre and post treatment. Pt reports full understanding of education and instruction provided this date.      Body Structures/Functions/Activity Limitations: PFM weakness with decreased localization and endurance, PFM spasm, Poor PFM control with limited ability to completely relax, Decreased awareness of functional factors that increase pelvic organ strain, Decreased awareness of normal bladder and bowel habits, control, and diet effects on functioning, Abdominal and core weakness, and Pain  Prognosis: Good    GOALS:  Patient Goal: to decrease pain     Short Term Goal Timeframe: 6 Weeks  Short Term Goals:  Patient to be independent with basic HEP. GOAL MET   Patient will decrease urine leak frequency and amount by 25% due to increasing PFM strength, endurance and localization. GOAL MET   Patient to identify normal bladder function and voiding patterns, diet effects on bladder, urge control strategies, and constipation/optimal stool consistency management. GOAL MET   Patient will Increase PFM localization to good with good ability to instantly and completely relax. GOAL NOT MET   Patient to demonstrate normal stool consistency 25% of the time with 25% improvement of sensation of complete defecation due to improved toileting and dietary habits along with improving PRM control. GOAL NOT MET   Patient to reduce need for manual assist with BM 25% due to improved toileting habits and stool motility to allow for reduction of colon pain. GOAL NOT MET   Patient will report pelvic pain reduced to 5/10 at worst to allow initiation of manual techniques.   GOAL NOT MET   Patient will report dyspareunia decreased to 5/10 to allow pt to participate in intercourse in a modified fashion for increased relationship satisfaction.  GOAL NOT MET     Long Term Goal Timeframe: 12 Weeks  Long-Term Goals:  Patient to be independent with progressed HEP. GOAL MET   Patient will ncrease

## 2025-07-01 ENCOUNTER — HOSPITAL ENCOUNTER (OUTPATIENT)
Dept: PHYSICAL THERAPY | Age: 40
Setting detail: THERAPIES SERIES
Discharge: HOME | End: 2025-07-01
Payer: COMMERCIAL

## 2025-07-01 PROCEDURE — 97140 MANUAL THERAPY 1/> REGIONS: CPT

## 2025-07-01 NOTE — PROGRESS NOTES
[facility]  OCCUPATIONAL THERAPY  OUTPATIENT REHABILITATION - SPECIALIZED THERAPY SERVICES  [] PELVIC HEALTH EVALUATION  [x] DAILY NOTE  [] PROGRESS NOTE [] DISCHARGE NOTE    Date: 2025  Patient Name:  Carlos Chang  : 1985  MRN: [id number]  CSN: [id number]    Referring Practitioner Polina Childress, APRN - CNP [id number]      Diagnosis  Diagnoses       R39.15 (ICD-10-CM) - Urgency of urination           Treatment Diagnosis M62.81 - Muscle Weakness (Generalized)  R39.15 - Urgency of Urination  K59.00 - Constipation, Unspecified  R10.2 - Pelvic and Perineal Pain  R10.30 - Lower Abdominal Pain, Unspecified  N50.82 - Scrotal Pain  R27.8 - Other Lack of Coordination   Date of Evaluation 25   Additional Pertinent History Carlos Chang has a past medical history of Anxiety, Bipolar disorder ([facility]), PTSD (post-traumatic stress disorder), and Substance abuse ([facility]).  he has a past surgical history that includes knee surgery (Left) and back surgery.  Pylonidal cyst (2018)    Allergies Allergies   Allergen Reactions    Molds & Smuts     Mushroom Extract Complex (Obsolete) Swelling    Mushroom      Other Reaction(s): Swelling/TONGUE & LIPS      Medications   Current Outpatient Medications:     vitamin D (VITAMIN D3) 50 MCG ( UT) CAPS capsule, Take 1 capsule by mouth daily, Disp: 90 capsule, Rfl: 1    pantoprazole (PROTONIX) 40 MG tablet, Take 1 tablet by mouth every morning (before breakfast), Disp: 90 tablet, Rfl: 1    gabapentin (NEURONTIN) 300 MG capsule, Take 1 capsule by mouth 3 times daily for 30 days. (Patient taking differently: Take 1 capsule by mouth in the morning and at bedtime.), Disp: 60 capsule, Rfl: 1    hydrOXYzine pamoate (VISTARIL) 25 MG capsule, TAKE 1 TO 2 CAPSULES BY MOUTH TWICE DAILY AS NEEDED FOR ANXIETY, Disp: , Rfl:     traMADol (ULTRAM) 50 MG tablet, Take 1 tablet by mouth every 6 hours as needed for Pain., Disp: , Rfl:     cyclobenzaprine

## 2025-07-08 ENCOUNTER — HOSPITAL ENCOUNTER (OUTPATIENT)
Dept: PHYSICAL THERAPY | Age: 40
Setting detail: THERAPIES SERIES
Discharge: HOME | End: 2025-07-08
Payer: COMMERCIAL

## 2025-07-08 PROCEDURE — 97110 THERAPEUTIC EXERCISES: CPT

## 2025-07-08 PROCEDURE — 97140 MANUAL THERAPY 1/> REGIONS: CPT

## 2025-07-08 NOTE — PROGRESS NOTES
[facility]  OCCUPATIONAL THERAPY  OUTPATIENT REHABILITATION - SPECIALIZED THERAPY SERVICES  [] PELVIC HEALTH EVALUATION  [x] DAILY NOTE  [] PROGRESS NOTE [] DISCHARGE NOTE    Date: 2025  Patient Name:  Carlos Chang  : 1985  MRN: [id number]  CSN: [id number]    Referring Practitioner Polina Childress, APRN - CNP [id number]      Diagnosis  Diagnoses       R39.15 (ICD-10-CM) - Urgency of urination           Treatment Diagnosis M62.81 - Muscle Weakness (Generalized)  R39.15 - Urgency of Urination  K59.00 - Constipation, Unspecified  R10.2 - Pelvic and Perineal Pain  R10.30 - Lower Abdominal Pain, Unspecified  N50.82 - Scrotal Pain  R27.8 - Other Lack of Coordination   Date of Evaluation 25   Additional Pertinent History Carlos Chang has a past medical history of Anxiety, Bipolar disorder ([facility]), PTSD (post-traumatic stress disorder), and Substance abuse ([facility]).  he has a past surgical history that includes knee surgery (Left) and back surgery.  Pylonidal cyst (2018)    Allergies Allergies   Allergen Reactions    Molds & Smuts     Mushroom Extract Complex (Obsolete) Swelling    Mushroom      Other Reaction(s): Swelling/TONGUE & LIPS      Medications   Current Outpatient Medications:     vitamin D (VITAMIN D3) 50 MCG ( UT) CAPS capsule, Take 1 capsule by mouth daily, Disp: 90 capsule, Rfl: 1    pantoprazole (PROTONIX) 40 MG tablet, Take 1 tablet by mouth every morning (before breakfast), Disp: 90 tablet, Rfl: 1    gabapentin (NEURONTIN) 300 MG capsule, Take 1 capsule by mouth 3 times daily for 30 days. (Patient taking differently: Take 1 capsule by mouth in the morning and at bedtime.), Disp: 60 capsule, Rfl: 1    hydrOXYzine pamoate (VISTARIL) 25 MG capsule, TAKE 1 TO 2 CAPSULES BY MOUTH TWICE DAILY AS NEEDED FOR ANXIETY, Disp: , Rfl:     traMADol (ULTRAM) 50 MG tablet, Take 1 tablet by mouth every 6 hours as needed for Pain., Disp: , Rfl:     cyclobenzaprine

## 2025-07-16 ENCOUNTER — HOSPITAL ENCOUNTER (OUTPATIENT)
Dept: PHYSICAL THERAPY | Age: 40
Setting detail: THERAPIES SERIES
Discharge: HOME | End: 2025-07-16
Payer: COMMERCIAL

## 2025-07-16 PROCEDURE — 97140 MANUAL THERAPY 1/> REGIONS: CPT

## 2025-07-16 NOTE — PROGRESS NOTES
[facility]  OCCUPATIONAL THERAPY  OUTPATIENT REHABILITATION - SPECIALIZED THERAPY SERVICES  [] PELVIC HEALTH EVALUATION  [x] DAILY NOTE  [] PROGRESS NOTE [] DISCHARGE NOTE    Date: 2025  Patient Name:  Carlos Chang  : 1985  MRN: [id number]  CSN: [id number]    Referring Practitioner Polina Childress, APRN - CNP [id number]      Diagnosis  Diagnoses       R39.15 (ICD-10-CM) - Urgency of urination           Treatment Diagnosis M62.81 - Muscle Weakness (Generalized)  R39.15 - Urgency of Urination  K59.00 - Constipation, Unspecified  R10.2 - Pelvic and Perineal Pain  R10.30 - Lower Abdominal Pain, Unspecified  N50.82 - Scrotal Pain  R27.8 - Other Lack of Coordination   Date of Evaluation 25   Additional Pertinent History Carlos Chang has a past medical history of Anxiety, Bipolar disorder ([facility]), PTSD (post-traumatic stress disorder), and Substance abuse ([facility]).  he has a past surgical history that includes knee surgery (Left) and back surgery.  Pylonidal cyst (2018)    Allergies Allergies   Allergen Reactions    Molds & Smuts     Mushroom Extract Complex (Obsolete) Swelling    Mushroom      Other Reaction(s): Swelling/TONGUE & LIPS      Medications   Current Outpatient Medications:     vitamin D (VITAMIN D3) 50 MCG ( UT) CAPS capsule, Take 1 capsule by mouth daily, Disp: 90 capsule, Rfl: 1    pantoprazole (PROTONIX) 40 MG tablet, Take 1 tablet by mouth every morning (before breakfast), Disp: 90 tablet, Rfl: 1    gabapentin (NEURONTIN) 300 MG capsule, Take 1 capsule by mouth 3 times daily for 30 days. (Patient taking differently: Take 1 capsule by mouth in the morning and at bedtime.), Disp: 60 capsule, Rfl: 1    hydrOXYzine pamoate (VISTARIL) 25 MG capsule, TAKE 1 TO 2 CAPSULES BY MOUTH TWICE DAILY AS NEEDED FOR ANXIETY, Disp: , Rfl:     traMADol (ULTRAM) 50 MG tablet, Take 1 tablet by mouth every 6 hours as needed for Pain., Disp: , Rfl:     cyclobenzaprine

## 2025-07-23 ENCOUNTER — HOSPITAL ENCOUNTER (OUTPATIENT)
Dept: PHYSICAL THERAPY | Age: 40
Setting detail: THERAPIES SERIES
Discharge: HOME | End: 2025-07-23
Payer: COMMERCIAL

## 2025-07-23 PROCEDURE — 97140 MANUAL THERAPY 1/> REGIONS: CPT

## 2025-07-23 NOTE — PROGRESS NOTES
* PLEASE SIGN, DATE AND TIME CERTIFICATION BELOW AND RETURN TO [facility] OUTPATIENT REHABILITATION (FAX #: [phone]).  ATTEST/CO-SIGN IF ACCESSING VIA [employer].  THANK YOU.**    I certify that I have examined the patient below and determined that Physical Medicine and Rehabilitation service is necessary and that I approve the established plan of care for up to 90 days or as specifically noted.  Attestation, signature or co-signature of physician indicates approval of certification requirements.    ________________________ ____________  Physician Signature   Date    [facility]  OCCUPATIONAL THERAPY  OUTPATIENT REHABILITATION - SPECIALIZED THERAPY SERVICES  [] PELVIC HEALTH EVALUATION  [] DAILY NOTE  [x] PROGRESS NOTE [] DISCHARGE NOTE    Date: 2025  Patient Name:  Carlos Chang  : 1985  MRN: [id number]  CSN: [id number]    Referring Practitioner Polina Childress, APRN - CNP [id number]      Diagnosis  Diagnoses       R39.15 (ICD-10-CM) - Urgency of urination           Treatment Diagnosis M62.81 - Muscle Weakness (Generalized)  R39.15 - Urgency of Urination  K59.00 - Constipation, Unspecified  R10.2 - Pelvic and Perineal Pain  R10.30 - Lower Abdominal Pain, Unspecified  N50.82 - Scrotal Pain  R27.8 - Other Lack of Coordination   Date of Evaluation 25   Additional Pertinent History Carlos Chang has a past medical history of Anxiety, Bipolar disorder ([facility]), PTSD (post-traumatic stress disorder), and Substance abuse ([facility]).  he has a past surgical history that includes knee surgery (Left) and back surgery.  Pylonidal cyst (2018)    Allergies Allergies   Allergen Reactions    Molds & Smuts     Mushroom Extract Complex (Obsolete) Swelling    Mushroom      Other Reaction(s): Swelling/TONGUE & LIPS      Medications   Current Outpatient Medications:     vitamin D (VITAMIN D3) 50 MCG ( UT) CAPS capsule, Take 1 capsule by mouth daily, Disp: 90 capsule, Rfl: 1

## 2025-07-30 ENCOUNTER — HOSPITAL ENCOUNTER (OUTPATIENT)
Dept: PHYSICAL THERAPY | Age: 40
Setting detail: THERAPIES SERIES
Discharge: HOME | End: 2025-07-30
Payer: COMMERCIAL

## 2025-07-30 PROCEDURE — 97140 MANUAL THERAPY 1/> REGIONS: CPT

## 2025-07-30 NOTE — PROGRESS NOTES
fashion for increased relationship satisfaction.  GOAL NOT MET     Long Term Goal Timeframe: 12 Weeks  Long-Term Goals:  Patient to be independent with progressed HEP. GOAL MET   Patient will ncrease PFM strength to 4/5 with endurance of 10 seconds x 10 reps to increase pelvic organ support and decrease incontinence. GOAL NOT MET   Patient will Increase abdominal strength of 4/5 or greater to allow for increased pelvic organ support and decreased incontinence. GOAL NOT MET   Patient will decrease urine leak frequency and amount by 75% GOAL NOT MET   This pt will report improved defecation ease and frequency by 75% in order to promote overall pt health.   GOAL NOT MET   This pt will report dyspareunia reduced to 1/10 at worst to allow pt to participate in intercourse with relative ease for increased  satisfaction within relationship and/or allow medical examinations to ensure pt health.  GOAL NOT MET   This pt will report pelvic pain decreased to 1/10 at worst to increase tolerance to work and home tasks. GOAL NOT MET   This pt will demonstrate NIH-CPSI score decreased to 10 or less in order to indication functional improvement with therapy.   GOAL NOT MET     PLAN:  Treatment Recommendations: Strengthening, Endurance Training, Neuromuscular Re-education, Manual Therapy - Soft Tissue Mobilization, Manual Therapy - Joint Manipulation, Pain Management, Home Exercise Program, Patient Education, Self-Care Education and Training, Positioning, Integrative Dry Needling, and Modalities    []  Plan of care initiated.  Plan to see patient 1 times per week for 12 weeks to address the treatment planned outlined above.  [x]  Continue with current plan of care  []  Modify plan of care as follows:    []  Hold pending physician visit  []  Discharge    Time In 1500   Time Out 1555   Timed Code Minutes: 55 min   Total Treatment Time: 55 min       Electronically Signed by: Lexii WHEATLEY, OTR/L [id number]

## 2025-07-31 ENCOUNTER — OFFICE VISIT (OUTPATIENT)
Dept: UROLOGY | Age: 40
End: 2025-07-31
Payer: COMMERCIAL

## 2025-07-31 VITALS — HEIGHT: 70 IN | HEART RATE: 81 BPM | WEIGHT: 249 LBS | BODY MASS INDEX: 35.65 KG/M2 | OXYGEN SATURATION: 98 %

## 2025-07-31 DIAGNOSIS — N43.3 HYDROCELE IN ADULT: ICD-10-CM

## 2025-07-31 DIAGNOSIS — N50.812 TESTICULAR PAIN, LEFT: Primary | ICD-10-CM

## 2025-07-31 DIAGNOSIS — I86.1 VARICOCELE: ICD-10-CM

## 2025-07-31 DIAGNOSIS — F41.9 ANXIETY: Primary | ICD-10-CM

## 2025-07-31 DIAGNOSIS — R39.15 URINARY URGENCY: ICD-10-CM

## 2025-07-31 PROCEDURE — 99214 OFFICE O/P EST MOD 30 MIN: CPT | Performed by: NURSE PRACTITIONER

## 2025-07-31 RX ORDER — GABAPENTIN 600 MG/1
600 TABLET ORAL 3 TIMES DAILY
COMMUNITY
Start: 2025-07-13

## 2025-07-31 RX ORDER — MELOXICAM 15 MG/1
15 TABLET ORAL DAILY
COMMUNITY
Start: 2025-06-04

## 2025-08-04 ENCOUNTER — APPOINTMENT (OUTPATIENT)
Dept: PHYSICAL THERAPY | Age: 40
End: 2025-08-04
Payer: COMMERCIAL

## 2025-08-06 ENCOUNTER — LAB (OUTPATIENT)
Dept: LAB | Age: 40
End: 2025-08-06

## 2025-08-06 DIAGNOSIS — Z11.3 ROUTINE SCREENING FOR STI (SEXUALLY TRANSMITTED INFECTION): ICD-10-CM

## 2025-08-07 ENCOUNTER — HOSPITAL ENCOUNTER (EMERGENCY)
Age: 40
Discharge: HOME | End: 2025-08-07
Payer: COMMERCIAL

## 2025-08-07 VITALS
RESPIRATION RATE: 18 BRPM | TEMPERATURE: 98 F | OXYGEN SATURATION: 95 % | SYSTOLIC BLOOD PRESSURE: 126 MMHG | BODY MASS INDEX: 35.65 KG/M2 | HEART RATE: 81 BPM | WEIGHT: 249 LBS | DIASTOLIC BLOOD PRESSURE: 89 MMHG | HEIGHT: 70 IN

## 2025-08-07 DIAGNOSIS — J01.00 ACUTE NON-RECURRENT MAXILLARY SINUSITIS: Primary | ICD-10-CM

## 2025-08-07 LAB
RPR SER QL: NONREACTIVE
SOURCE: NORMAL

## 2025-08-07 PROCEDURE — 99213 OFFICE O/P EST LOW 20 MIN: CPT

## 2025-08-07 RX ORDER — FLUTICASONE PROPIONATE 50 MCG
2 SPRAY, SUSPENSION (ML) NASAL DAILY
Qty: 16 G | Refills: 0 | Status: SHIPPED | OUTPATIENT
Start: 2025-08-07

## 2025-08-07 ASSESSMENT — ENCOUNTER SYMPTOMS: SINUS PRESSURE: 1

## 2025-08-07 ASSESSMENT — PAIN DESCRIPTION - LOCATION: LOCATION: FACE

## 2025-08-07 ASSESSMENT — PAIN SCALES - GENERAL: PAINLEVEL_OUTOF10: 1

## 2025-08-07 ASSESSMENT — PAIN DESCRIPTION - PAIN TYPE: TYPE: ACUTE PAIN

## 2025-08-07 ASSESSMENT — PAIN - FUNCTIONAL ASSESSMENT: PAIN_FUNCTIONAL_ASSESSMENT: 0-10

## 2025-08-08 LAB
CHLAMYDIA DNA UR QL NAA+PROBE: NEGATIVE
CHLAMYDIA, GC DNA AMP, URINE: NORMAL
N GONORRHOEA DNA UR QL NAA+PROBE: NEGATIVE

## 2025-08-09 LAB
HSV1 DNA SPEC QL NAA+PROBE: NOT DETECTED
HSV2 DNA SPEC QL NAA+PROBE: NOT DETECTED
SPECIMEN SOURCE: NORMAL

## 2025-08-11 ENCOUNTER — TELEPHONE (OUTPATIENT)
Dept: FAMILY MEDICINE CLINIC | Age: 40
End: 2025-08-11

## 2025-08-11 ENCOUNTER — RESULTS FOLLOW-UP (OUTPATIENT)
Dept: FAMILY MEDICINE CLINIC | Age: 40
End: 2025-08-11

## 2025-08-11 ENCOUNTER — HOSPITAL ENCOUNTER (OUTPATIENT)
Dept: PHYSICAL THERAPY | Age: 40
Setting detail: THERAPIES SERIES
Discharge: HOME | End: 2025-08-11
Payer: COMMERCIAL

## 2025-08-11 LAB
SOURCE: NORMAL
TRICHOMONAS VAGINALIS, MOLECULAR: NEGATIVE

## 2025-08-11 PROCEDURE — 97140 MANUAL THERAPY 1/> REGIONS: CPT

## 2025-08-18 ENCOUNTER — HOSPITAL ENCOUNTER (OUTPATIENT)
Dept: PHYSICAL THERAPY | Age: 40
Setting detail: THERAPIES SERIES
Discharge: HOME | End: 2025-08-18
Payer: COMMERCIAL

## 2025-08-18 PROCEDURE — 97140 MANUAL THERAPY 1/> REGIONS: CPT

## 2025-08-25 ENCOUNTER — APPOINTMENT (OUTPATIENT)
Dept: PHYSICAL THERAPY | Age: 40
End: 2025-08-25
Payer: COMMERCIAL

## 2025-08-26 ENCOUNTER — HOSPITAL ENCOUNTER (OUTPATIENT)
Dept: PHYSICAL THERAPY | Age: 40
Setting detail: THERAPIES SERIES
Discharge: HOME | End: 2025-08-26
Payer: COMMERCIAL

## 2025-08-26 PROCEDURE — 97530 THERAPEUTIC ACTIVITIES: CPT

## 2025-08-26 PROCEDURE — 97140 MANUAL THERAPY 1/> REGIONS: CPT
